# Patient Record
Sex: MALE | Race: WHITE | NOT HISPANIC OR LATINO | URBAN - METROPOLITAN AREA
[De-identification: names, ages, dates, MRNs, and addresses within clinical notes are randomized per-mention and may not be internally consistent; named-entity substitution may affect disease eponyms.]

---

## 2017-05-15 ENCOUNTER — OUTPATIENT (OUTPATIENT)
Dept: OUTPATIENT SERVICES | Facility: HOSPITAL | Age: 47
LOS: 1 days | Discharge: HOME | End: 2017-05-15

## 2017-06-28 DIAGNOSIS — R91.8 OTHER NONSPECIFIC ABNORMAL FINDING OF LUNG FIELD: ICD-10-CM

## 2017-09-04 ENCOUNTER — TRANSCRIPTION ENCOUNTER (OUTPATIENT)
Age: 47
End: 2017-09-04

## 2018-05-20 ENCOUNTER — TRANSCRIPTION ENCOUNTER (OUTPATIENT)
Age: 48
End: 2018-05-20

## 2018-05-22 ENCOUNTER — OUTPATIENT (OUTPATIENT)
Dept: OUTPATIENT SERVICES | Facility: HOSPITAL | Age: 48
LOS: 1 days | Discharge: HOME | End: 2018-05-22

## 2018-05-22 DIAGNOSIS — R05 COUGH: ICD-10-CM

## 2018-06-19 ENCOUNTER — OUTPATIENT (OUTPATIENT)
Dept: OUTPATIENT SERVICES | Facility: HOSPITAL | Age: 48
LOS: 1 days | Discharge: HOME | End: 2018-06-19

## 2018-06-19 DIAGNOSIS — R91.8 OTHER NONSPECIFIC ABNORMAL FINDING OF LUNG FIELD: ICD-10-CM

## 2020-01-24 ENCOUNTER — TRANSCRIPTION ENCOUNTER (OUTPATIENT)
Age: 50
End: 2020-01-24

## 2020-03-14 ENCOUNTER — OUTPATIENT (OUTPATIENT)
Dept: OUTPATIENT SERVICES | Facility: HOSPITAL | Age: 50
LOS: 1 days | Discharge: HOME | End: 2020-03-14
Payer: COMMERCIAL

## 2020-03-14 DIAGNOSIS — R91.8 OTHER NONSPECIFIC ABNORMAL FINDING OF LUNG FIELD: ICD-10-CM

## 2020-03-14 PROCEDURE — 71250 CT THORAX DX C-: CPT | Mod: 26

## 2020-03-25 PROBLEM — Z00.00 ENCOUNTER FOR PREVENTIVE HEALTH EXAMINATION: Status: ACTIVE | Noted: 2020-03-25

## 2020-05-05 ENCOUNTER — APPOINTMENT (OUTPATIENT)
Dept: GASTROENTEROLOGY | Facility: CLINIC | Age: 50
End: 2020-05-05
Payer: COMMERCIAL

## 2020-05-05 DIAGNOSIS — Z78.9 OTHER SPECIFIED HEALTH STATUS: ICD-10-CM

## 2020-05-05 DIAGNOSIS — Z87.09 PERSONAL HISTORY OF OTHER DISEASES OF THE RESPIRATORY SYSTEM: ICD-10-CM

## 2020-05-05 DIAGNOSIS — Z80.8 FAMILY HISTORY OF MALIGNANT NEOPLASM OF OTHER ORGANS OR SYSTEMS: ICD-10-CM

## 2020-05-05 DIAGNOSIS — Z12.11 ENCOUNTER FOR SCREENING FOR MALIGNANT NEOPLASM OF COLON: ICD-10-CM

## 2020-05-05 DIAGNOSIS — Z87.898 PERSONAL HISTORY OF OTHER SPECIFIED CONDITIONS: ICD-10-CM

## 2020-05-05 PROCEDURE — 99204 OFFICE O/P NEW MOD 45 MIN: CPT | Mod: 95

## 2020-05-05 NOTE — HISTORY OF PRESENT ILLNESS
[Home] : at home, [unfilled] , at the time of the visit. [Medical Office: ([facility])___] : at the medical office located in  [Patient] : the patient [Self] : self [FreeTextEntry2] : Joe Hobson [FreeTextEntry4] : Raven Cole [Nausea] : denies nausea [Vomiting] : denies vomiting [Diarrhea] : denies diarrhea [Constipation] : denies constipation [Yellow Skin Or Eyes (Jaundice)] : denies jaundice [Abdominal Pain] : denies abdominal pain [Abdominal Swelling] : denies abdominal swelling [Rectal Pain] : denies rectal pain [de-identified] : This is a 49 year old male who presents for evaluation of dysphagia.  The patient has a history of delayed swallowing for many years.  He has been told that he has achalasia.  He has never had any work-up for achalasia.  The patient is able to eat when he eats slowly, but has reflux, and cannot eat after 5 PM.  His symptoms are controlled on omeprazole 20 mg p.o. daily.  He has never had to go to the emergency room for dysphagia.  He denies weight loss or black or bloody stools.  He denies nausea or vomiting if he eats slowly.  Patient has never had a colonoscopy.  The patient had a CAT scan for follow-up of pulmonary nodules and a dilated fluid-filled esophagus was found in March 2020 it had also been found on a previous CAT scan of the chest

## 2020-07-07 ENCOUNTER — OUTPATIENT (OUTPATIENT)
Dept: OUTPATIENT SERVICES | Facility: HOSPITAL | Age: 50
LOS: 1 days | Discharge: HOME | End: 2020-07-07

## 2020-07-07 ENCOUNTER — LABORATORY RESULT (OUTPATIENT)
Age: 50
End: 2020-07-07

## 2020-07-07 DIAGNOSIS — Z11.59 ENCOUNTER FOR SCREENING FOR OTHER VIRAL DISEASES: ICD-10-CM

## 2020-07-27 ENCOUNTER — OUTPATIENT (OUTPATIENT)
Dept: OUTPATIENT SERVICES | Facility: HOSPITAL | Age: 50
LOS: 1 days | Discharge: HOME | End: 2020-07-27

## 2020-07-27 ENCOUNTER — LABORATORY RESULT (OUTPATIENT)
Age: 50
End: 2020-07-27

## 2020-07-27 DIAGNOSIS — Z11.59 ENCOUNTER FOR SCREENING FOR OTHER VIRAL DISEASES: ICD-10-CM

## 2020-07-30 ENCOUNTER — RESULT REVIEW (OUTPATIENT)
Age: 50
End: 2020-07-30

## 2020-07-30 ENCOUNTER — OUTPATIENT (OUTPATIENT)
Dept: OUTPATIENT SERVICES | Facility: HOSPITAL | Age: 50
LOS: 1 days | Discharge: HOME | End: 2020-07-30
Payer: COMMERCIAL

## 2020-07-30 ENCOUNTER — TRANSCRIPTION ENCOUNTER (OUTPATIENT)
Age: 50
End: 2020-07-30

## 2020-07-30 VITALS — HEART RATE: 67 BPM | SYSTOLIC BLOOD PRESSURE: 111 MMHG | OXYGEN SATURATION: 95 % | RESPIRATION RATE: 15 BRPM

## 2020-07-30 VITALS
DIASTOLIC BLOOD PRESSURE: 78 MMHG | HEART RATE: 95 BPM | RESPIRATION RATE: 18 BRPM | HEIGHT: 66 IN | WEIGHT: 149.91 LBS | SYSTOLIC BLOOD PRESSURE: 127 MMHG | TEMPERATURE: 98 F

## 2020-07-30 DIAGNOSIS — Z12.11 ENCOUNTER FOR SCREENING FOR MALIGNANT NEOPLASM OF COLON: ICD-10-CM

## 2020-07-30 PROCEDURE — 88305 TISSUE EXAM BY PATHOLOGIST: CPT | Mod: 26

## 2020-07-30 PROCEDURE — 45380 COLONOSCOPY AND BIOPSY: CPT

## 2020-07-30 PROCEDURE — 88312 SPECIAL STAINS GROUP 1: CPT | Mod: 26

## 2020-07-30 PROCEDURE — 43239 EGD BIOPSY SINGLE/MULTIPLE: CPT | Mod: XS

## 2020-07-30 RX ORDER — ONDANSETRON 8 MG/1
4 TABLET, FILM COATED ORAL ONCE
Refills: 0 | Status: DISCONTINUED | OUTPATIENT
Start: 2020-07-30 | End: 2020-08-14

## 2020-07-30 RX ORDER — SODIUM CHLORIDE 9 MG/ML
1000 INJECTION, SOLUTION INTRAVENOUS
Refills: 0 | Status: DISCONTINUED | OUTPATIENT
Start: 2020-07-30 | End: 2020-08-14

## 2020-07-30 NOTE — ASU PATIENT PROFILE, ADULT - NS PRO PASSIVE SMOKE EXP
[NL] : warm [Clear Effusion] : clear effusion [Erythema] : erythema [Purulent Effusion] : purulent effusion [Clear Rhinorrhea] : clear rhinorrhea No

## 2020-07-30 NOTE — ASU DISCHARGE PLAN (ADULT/PEDIATRIC) - ACCOMPANIED BY
Spouse Non-Graft Cartilage Fenestration Text: The cartilage was fenestrated with a 2mm punch biopsy to help facilitate healing.

## 2020-07-30 NOTE — ASU DISCHARGE PLAN (ADULT/PEDIATRIC) - ASU DC SPECIAL INSTRUCTIONSFT
Follow up with Dr Falk for complete workup for dysphagia Follow up with Dr Falk for complete workup for dysphagia  Pt scheduled for motility study on 8/6/2020 at 940 am , will need covid testing on 8/3/2020

## 2020-07-30 NOTE — H&P PST ADULT - HISTORY OF PRESENT ILLNESS
This is a 49 year old male who presents for evaluation of dysphagia. The patient has a history of delayed swallowing for many years. He has been told that he has achalasia. He has never had any work-up for achalasia. The patient is able to eat when he eats slowly, but has reflux, and cannot eat after 5 PM.

## 2020-07-31 LAB
SURGICAL PATHOLOGY STUDY: SIGNIFICANT CHANGE UP
SURGICAL PATHOLOGY STUDY: SIGNIFICANT CHANGE UP

## 2020-08-03 DIAGNOSIS — K64.8 OTHER HEMORRHOIDS: ICD-10-CM

## 2020-08-03 DIAGNOSIS — K62.89 OTHER SPECIFIED DISEASES OF ANUS AND RECTUM: ICD-10-CM

## 2020-08-03 DIAGNOSIS — K21.0 GASTRO-ESOPHAGEAL REFLUX DISEASE WITH ESOPHAGITIS: ICD-10-CM

## 2020-08-03 DIAGNOSIS — K29.50 UNSPECIFIED CHRONIC GASTRITIS WITHOUT BLEEDING: ICD-10-CM

## 2020-08-04 ENCOUNTER — OUTPATIENT (OUTPATIENT)
Dept: OUTPATIENT SERVICES | Facility: HOSPITAL | Age: 50
LOS: 1 days | Discharge: HOME | End: 2020-08-04

## 2020-08-04 ENCOUNTER — LABORATORY RESULT (OUTPATIENT)
Age: 50
End: 2020-08-04

## 2020-08-04 DIAGNOSIS — Z11.59 ENCOUNTER FOR SCREENING FOR OTHER VIRAL DISEASES: ICD-10-CM

## 2020-08-04 PROBLEM — J84.9 INTERSTITIAL PULMONARY DISEASE, UNSPECIFIED: Chronic | Status: ACTIVE | Noted: 2020-07-30

## 2020-08-06 ENCOUNTER — RESULT REVIEW (OUTPATIENT)
Age: 50
End: 2020-08-06

## 2020-08-06 ENCOUNTER — OUTPATIENT (OUTPATIENT)
Dept: OUTPATIENT SERVICES | Facility: HOSPITAL | Age: 50
LOS: 1 days | Discharge: HOME | End: 2020-08-06
Payer: COMMERCIAL

## 2020-08-06 VITALS
HEART RATE: 71 BPM | RESPIRATION RATE: 18 BRPM | SYSTOLIC BLOOD PRESSURE: 124 MMHG | HEIGHT: 66 IN | OXYGEN SATURATION: 100 % | DIASTOLIC BLOOD PRESSURE: 71 MMHG | WEIGHT: 154.98 LBS | TEMPERATURE: 99 F

## 2020-08-06 VITALS
DIASTOLIC BLOOD PRESSURE: 77 MMHG | HEART RATE: 71 BPM | OXYGEN SATURATION: 98 % | SYSTOLIC BLOOD PRESSURE: 143 MMHG | RESPIRATION RATE: 18 BRPM

## 2020-08-06 PROCEDURE — 91037 ESOPH IMPED FUNCTION TEST: CPT | Mod: 26

## 2020-08-06 PROCEDURE — 91010 ESOPHAGUS MOTILITY STUDY: CPT | Mod: 26

## 2020-08-06 PROCEDURE — 71045 X-RAY EXAM CHEST 1 VIEW: CPT | Mod: 26

## 2020-08-11 DIAGNOSIS — R13.10 DYSPHAGIA, UNSPECIFIED: ICD-10-CM

## 2020-08-11 DIAGNOSIS — J84.9 INTERSTITIAL PULMONARY DISEASE, UNSPECIFIED: ICD-10-CM

## 2020-10-26 ENCOUNTER — TRANSCRIPTION ENCOUNTER (OUTPATIENT)
Age: 50
End: 2020-10-26

## 2020-11-28 ENCOUNTER — TRANSCRIPTION ENCOUNTER (OUTPATIENT)
Age: 50
End: 2020-11-28

## 2020-11-29 ENCOUNTER — TRANSCRIPTION ENCOUNTER (OUTPATIENT)
Age: 50
End: 2020-11-29

## 2020-12-23 PROBLEM — Z12.11 ENCOUNTER FOR SCREENING COLONOSCOPY: Status: RESOLVED | Noted: 2020-05-05 | Resolved: 2020-12-23

## 2021-02-19 ENCOUNTER — TRANSCRIPTION ENCOUNTER (OUTPATIENT)
Age: 51
End: 2021-02-19

## 2021-02-28 ENCOUNTER — TRANSCRIPTION ENCOUNTER (OUTPATIENT)
Age: 51
End: 2021-02-28

## 2021-03-05 ENCOUNTER — TRANSCRIPTION ENCOUNTER (OUTPATIENT)
Age: 51
End: 2021-03-05

## 2021-03-10 ENCOUNTER — NON-APPOINTMENT (OUTPATIENT)
Age: 51
End: 2021-03-10

## 2021-03-10 DIAGNOSIS — R06.02 SHORTNESS OF BREATH: ICD-10-CM

## 2021-03-10 DIAGNOSIS — J45.909 UNSPECIFIED ASTHMA, UNCOMPLICATED: ICD-10-CM

## 2021-03-10 DIAGNOSIS — R93.89 ABNORMAL FINDINGS ON DIAGNOSTIC IMAGING OF OTHER SPECIFIED BODY STRUCTURES: ICD-10-CM

## 2021-03-16 ENCOUNTER — APPOINTMENT (OUTPATIENT)
Dept: PULMONOLOGY | Facility: CLINIC | Age: 51
End: 2021-03-16
Payer: COMMERCIAL

## 2021-03-16 VITALS
OXYGEN SATURATION: 98 % | BODY MASS INDEX: 26.52 KG/M2 | RESPIRATION RATE: 14 BRPM | DIASTOLIC BLOOD PRESSURE: 78 MMHG | WEIGHT: 165 LBS | HEIGHT: 66 IN | SYSTOLIC BLOOD PRESSURE: 120 MMHG | HEART RATE: 86 BPM

## 2021-03-16 PROCEDURE — 99213 OFFICE O/P EST LOW 20 MIN: CPT

## 2021-03-16 PROCEDURE — 99072 ADDL SUPL MATRL&STAF TM PHE: CPT

## 2021-03-16 NOTE — PHYSICAL EXAM
[No Acute Distress] : no acute distress [Normal Oropharynx] : normal oropharynx [Normal Appearance] : normal appearance [No Neck Mass] : no neck mass [Normal Rate/Rhythm] : normal rate/rhythm [Normal S1, S2] : normal s1, s2 [No Murmurs] : no murmurs [No Resp Distress] : no resp distress [Clear to Auscultation Bilaterally] : clear to auscultation bilaterally [Normal Gait] : normal gait [No Clubbing] : no clubbing [No Cyanosis] : no cyanosis [No Edema] : no edema [FROM] : FROM [No Focal Deficits] : no focal deficits [Oriented x3] : oriented x3 [Normal Affect] : normal affect

## 2021-03-16 NOTE — HISTORY OF PRESENT ILLNESS
[Doing Well] : doing well [Wheezing] : denies wheezing [Cough] : denies coughing [Shortness Of Breath] : denies shortness of breath [Chest Tightness Or Heavy Pressure] : denies chest tightness [Feelings Of Weakness On Exertion] : denies reduced exercise tolerance [Cough at Night] : not awakened at night with cough [Wheezing at Night] : not awakened at night because of wheezing or trouble breathing [Goals--Doing Well] : the patient is doing well with ~his/her~ asthma goals

## 2021-07-12 ENCOUNTER — APPOINTMENT (OUTPATIENT)
Age: 51
End: 2021-07-12

## 2021-07-12 ENCOUNTER — APPOINTMENT (OUTPATIENT)
Dept: PULMONOLOGY | Facility: CLINIC | Age: 51
End: 2021-07-12

## 2021-07-13 NOTE — ASU PREOP CHECKLIST - HEIGHT IN INCHES
Klaudia Melendez presents today for   Chief Complaint   Patient presents with    Follow-up    Back Pain       Is someone accompanying this pt? no    Is the patient using any DME equipment during OV? no    Depression Screening:  3 most recent PHQ Screens 7/13/2021   Little interest or pleasure in doing things Not at all   Feeling down, depressed, irritable, or hopeless Not at all   Total Score PHQ 2 0       Learning Assessment:  Learning Assessment 7/6/2021   PRIMARY LEARNER Patient   HIGHEST LEVEL OF EDUCATION - PRIMARY LEARNER  GRADUATED HIGH SCHOOL OR GED   BARRIERS PRIMARY LEARNER NONE   CO-LEARNER CAREGIVER No   PRIMARY LANGUAGE ENGLISH   LEARNER PREFERENCE PRIMARY READING   ANSWERED BY patient    RELATIONSHIP SELF       Health Maintenance reviewed and discussed and ordered per Provider. Health Maintenance Due   Topic Date Due    Pneumococcal 0-64 years (1 of 2 - PPSV23) Never done    DTaP/Tdap/Td series (1 - Tdap) Never done    Colorectal Cancer Screening Combo  Never done    Breast Cancer Screen Mammogram  07/13/2016   . Coordination of Care:  1. Have you been to the ER, urgent care clinic since your last visit? Hospitalized since your last visit? no    2. Have you seen or consulted any other health care providers outside of the [address] [place] [address] since your last visit? Include any pap smears or colon screening.  no      Health Maintenance Due   Topic Date Due    Pneumococcal 0-64 years (1 of 2 - PPSV23) Never done    DTaP/Tdap/Td series (1 - Tdap) Never done    Colorectal Cancer Screening Combo  Never done    Breast Cancer Screen Mammogram  07/13/2016 6

## 2021-12-04 ENCOUNTER — TRANSCRIPTION ENCOUNTER (OUTPATIENT)
Age: 51
End: 2021-12-04

## 2021-12-08 ENCOUNTER — APPOINTMENT (OUTPATIENT)
Age: 51
End: 2021-12-08
Payer: COMMERCIAL

## 2021-12-08 VITALS
BODY MASS INDEX: 26.2 KG/M2 | HEART RATE: 78 BPM | HEIGHT: 66 IN | DIASTOLIC BLOOD PRESSURE: 70 MMHG | RESPIRATION RATE: 14 BRPM | SYSTOLIC BLOOD PRESSURE: 120 MMHG | WEIGHT: 163 LBS | OXYGEN SATURATION: 98 %

## 2021-12-08 PROCEDURE — 99214 OFFICE O/P EST MOD 30 MIN: CPT

## 2022-04-29 RX ORDER — BUDESONIDE AND FORMOTEROL FUMARATE DIHYDRATE 80; 4.5 UG/1; UG/1
80-4.5 AEROSOL RESPIRATORY (INHALATION) TWICE DAILY
Refills: 0 | Status: DISCONTINUED | COMMUNITY
End: 2022-04-29

## 2022-04-29 RX ORDER — OMEPRAZOLE 20 MG/1
20 TABLET, DELAYED RELEASE ORAL
Refills: 0 | Status: DISCONTINUED | COMMUNITY
End: 2022-04-29

## 2022-04-29 RX ORDER — ALBUTEROL SULFATE 90 UG/1
108 (90 BASE) INHALANT RESPIRATORY (INHALATION)
Qty: 128 | Refills: 2 | Status: DISCONTINUED | COMMUNITY
Start: 2021-03-16 | End: 2022-04-29

## 2022-04-29 RX ORDER — SODIUM SULFATE, POTASSIUM SULFATE, MAGNESIUM SULFATE 17.5; 3.13; 1.6 G/ML; G/ML; G/ML
17.5-3.13-1.6 SOLUTION, CONCENTRATE ORAL
Qty: 2 | Refills: 0 | Status: DISCONTINUED | COMMUNITY
Start: 2020-05-05 | End: 2022-04-29

## 2022-05-04 NOTE — HISTORY OF PRESENT ILLNESS
[Home] : at home, [unfilled] , at the time of the visit. [Verbal consent obtained from patient] : the patient, [unfilled] [Other Location: e.g. Home (Enter Location, City,State)___] : at [unfilled] [FreeTextEntry4] : Raven Cole

## 2022-05-09 ENCOUNTER — APPOINTMENT (OUTPATIENT)
Dept: GASTROENTEROLOGY | Facility: CLINIC | Age: 52
End: 2022-05-09

## 2022-05-23 ENCOUNTER — APPOINTMENT (OUTPATIENT)
Age: 52
End: 2022-05-23
Payer: COMMERCIAL

## 2022-05-23 VITALS
SYSTOLIC BLOOD PRESSURE: 130 MMHG | DIASTOLIC BLOOD PRESSURE: 70 MMHG | WEIGHT: 165 LBS | HEIGHT: 66 IN | BODY MASS INDEX: 26.52 KG/M2 | RESPIRATION RATE: 14 BRPM | HEART RATE: 102 BPM | OXYGEN SATURATION: 97 %

## 2022-05-23 PROCEDURE — 94727 GAS DIL/WSHOT DETER LNG VOL: CPT

## 2022-05-23 PROCEDURE — 94729 DIFFUSING CAPACITY: CPT

## 2022-05-23 PROCEDURE — 94010 BREATHING CAPACITY TEST: CPT

## 2022-05-23 PROCEDURE — 99213 OFFICE O/P EST LOW 20 MIN: CPT | Mod: 25

## 2022-05-23 RX ORDER — ALBUTEROL SULFATE 90 UG/1
108 (90 BASE) INHALANT RESPIRATORY (INHALATION)
Qty: 1 | Refills: 5 | Status: ACTIVE | COMMUNITY
Start: 2022-05-23

## 2022-05-29 ENCOUNTER — NON-APPOINTMENT (OUTPATIENT)
Age: 52
End: 2022-05-29

## 2022-10-25 ENCOUNTER — APPOINTMENT (OUTPATIENT)
Dept: ORTHOPEDIC SURGERY | Facility: CLINIC | Age: 52
End: 2022-10-25

## 2022-10-25 DIAGNOSIS — M16.11 UNILATERAL PRIMARY OSTEOARTHRITIS, RIGHT HIP: ICD-10-CM

## 2022-10-25 PROCEDURE — 99203 OFFICE O/P NEW LOW 30 MIN: CPT

## 2022-10-25 PROCEDURE — 73502 X-RAY EXAM HIP UNI 2-3 VIEWS: CPT

## 2022-10-25 NOTE — IMAGING
[de-identified] :  pleasant easy to examine in no distress\par \par Lumbar exam some spasm tightness negative straight leg bilaterally reflexes brisk and symmetric\par \par Hips good motion mild groin pain on the right good strength normal gait\par \par Right hip x-ray unremarkable no arthritis no AVN

## 2022-10-25 NOTE — ASSESSMENT
[FreeTextEntry1] :  recommended conservative treatment stretching heat some Tylenol indicated that his symptoms from the hip or mild there was no reason to get an MRI of the hip back.  Return p.r.n.

## 2022-10-25 NOTE — HISTORY OF PRESENT ILLNESS
[de-identified] : 52-year-old gentleman works in a [employer] this difficulty with pain in the right hip when driving getting up out of a seated position no trauma it has been a few months denies any back pain some lung disease also has achalasia pain is 1 at rest 3 with activity does not smoke no drug allergies no treatment in the past no problem with the left hip

## 2022-11-07 ENCOUNTER — APPOINTMENT (OUTPATIENT)
Age: 52
End: 2022-11-07

## 2022-11-07 VITALS
DIASTOLIC BLOOD PRESSURE: 78 MMHG | WEIGHT: 162 LBS | SYSTOLIC BLOOD PRESSURE: 110 MMHG | OXYGEN SATURATION: 98 % | BODY MASS INDEX: 26.03 KG/M2 | HEIGHT: 66 IN | HEART RATE: 88 BPM

## 2022-11-07 PROCEDURE — 99213 OFFICE O/P EST LOW 20 MIN: CPT

## 2022-12-27 ENCOUNTER — RESULT REVIEW (OUTPATIENT)
Age: 52
End: 2022-12-27

## 2022-12-27 ENCOUNTER — OUTPATIENT (OUTPATIENT)
Dept: OUTPATIENT SERVICES | Facility: HOSPITAL | Age: 52
LOS: 1 days | Discharge: HOME | End: 2022-12-27

## 2022-12-27 DIAGNOSIS — R91.8 OTHER NONSPECIFIC ABNORMAL FINDING OF LUNG FIELD: ICD-10-CM

## 2022-12-27 PROCEDURE — 71250 CT THORAX DX C-: CPT | Mod: 26

## 2023-03-16 ENCOUNTER — NON-APPOINTMENT (OUTPATIENT)
Age: 53
End: 2023-03-16

## 2023-03-20 ENCOUNTER — NON-APPOINTMENT (OUTPATIENT)
Age: 53
End: 2023-03-20

## 2023-04-06 ENCOUNTER — APPOINTMENT (OUTPATIENT)
Dept: OTOLARYNGOLOGY | Facility: CLINIC | Age: 53
End: 2023-04-06
Payer: COMMERCIAL

## 2023-04-06 ENCOUNTER — NON-APPOINTMENT (OUTPATIENT)
Age: 53
End: 2023-04-06

## 2023-04-06 VITALS
SYSTOLIC BLOOD PRESSURE: 139 MMHG | WEIGHT: 162 LBS | OXYGEN SATURATION: 96 % | TEMPERATURE: 98 F | HEIGHT: 66 IN | DIASTOLIC BLOOD PRESSURE: 79 MMHG | HEART RATE: 82 BPM | BODY MASS INDEX: 26.03 KG/M2

## 2023-04-06 PROCEDURE — 99204 OFFICE O/P NEW MOD 45 MIN: CPT | Mod: 25

## 2023-04-06 PROCEDURE — 92550 TYMPANOMETRY & REFLEX THRESH: CPT | Mod: 52

## 2023-04-06 PROCEDURE — 92557 COMPREHENSIVE HEARING TEST: CPT

## 2023-04-06 PROCEDURE — 31575 DIAGNOSTIC LARYNGOSCOPY: CPT

## 2023-04-06 NOTE — ASSESSMENT
[FreeTextEntry1] : Reviewed reflux precautions and provided the patient with the corresponding educational handout. Daily famotidine & RTC 6 wks\par \par I asked him to see Dr. Bright regarding his achalasia\par \par CT sinus & RTC\par \par RTC annually for audios, sooner as needed for worsening hearing or tinnitus. Discussed the importance not putting qtips or other foreign objects in the ears.\par

## 2023-04-06 NOTE — PHYSICAL EXAM
[Nasal Endoscopy Performed] : nasal endoscopy was performed, see procedure section for findings [] : septum deviated to the left [Removed] : palatine tonsils previously removed [Laryngoscopy Performed] : laryngoscopy was performed, see procedure section for findings [Normal] : no masses and lesions seen, face is symmetric

## 2023-04-06 NOTE — PROCEDURE
[FreeTextEntry6] : Indication: requirement for exam not possible via anterior rhinoscopy; chronic sinusitis\par After verbal consent and the administration of an aerosolized phenylephrine/lidocaine mix, examination was performed with a flexible endoscope attached to a video monitoring system. Findings:\par Septum: widely to the L\par Mucosa: normal\par Polyposis: not present\par Inferior turbinates: unremarkable\par Middle and superior turbinates: normal\par Inferior meatus: unremarkable\par Middle meatus: unremarkable\par Superior meatus: unremarkable\par Speno-ethmoidal recess: unremarkable\par Nasopharynx: unremarkable\par Secretions: unremarkable\par Other findings: none [de-identified] : Indication: requirement for exam not possible via anterior examination; hoarseness\par After verbal consent and the administration of an aerosolized phenylephrine/lidocaine mix, examination was performed with a flexible endoscope placed through the nose which was attached to a video monitoring system. Findings:\par Nasopharynx: unremarkable\par Soft palate, lateral and posterior pharyngeal walls: unremarkable\par Base of tongue & lingual tonsil: moderate retrodisplacement\par Vallecula: unremarkable\par Epiglottis: unremarkable\par Piriform sinuses and pharyngoesophageal junction: unremarkable\par Arytenoids and AE folds: mild-mod interarytenoid edema\par Ventricle and false vocal folds: unremarkable\par True vocal folds: early anterior third nodules; normal movement bilaterally with good apposition in phonation\par Visible subglottis: unremarkable\par Narrow-band imaging: not used\par Other findings: ELM

## 2023-04-06 NOTE — HISTORY OF PRESENT ILLNESS
[de-identified] : Chronic throat trouble/sore throat/sinus infections diagnosed as strep throat & repeatedly treated with antibiotics until ~2015 when he was dx w/ achalasia. He takes omeprazole as needed for hoarseness & throat irritation but not riccardo heartburn. \par Hx of a lot of sinus infections which can last for months; these consist of "fluid running down the throat" and include midfacial pressure; however, no discolored rhinorrhea. He thinks these occur after nocturnal episodes of reflux. \par At times he also has ear clogging if he eats late d/t reflux. No baseline HL but has bilat nonpulsatile tinnitus. Lots of HL in the family; uses qtips. \par No obvious allergy sxs.

## 2023-04-06 NOTE — DATA REVIEWED
[de-identified] : today: nl to severe SNHL AU;  AU\par - Immitance testing w/ type A AU\par  [de-identified] : EGD 7/20: tortuous/diffuse dilation of the esophageal lumen\par HRM 8/20: absent contractility

## 2023-04-17 ENCOUNTER — APPOINTMENT (OUTPATIENT)
Dept: NEUROSURGERY | Facility: CLINIC | Age: 53
End: 2023-04-17

## 2023-05-15 ENCOUNTER — APPOINTMENT (OUTPATIENT)
Dept: OTOLARYNGOLOGY | Facility: CLINIC | Age: 53
End: 2023-05-15
Payer: COMMERCIAL

## 2023-05-15 VITALS
HEIGHT: 66 IN | OXYGEN SATURATION: 97 % | SYSTOLIC BLOOD PRESSURE: 121 MMHG | TEMPERATURE: 97.7 F | HEART RATE: 67 BPM | DIASTOLIC BLOOD PRESSURE: 82 MMHG | WEIGHT: 151 LBS | BODY MASS INDEX: 24.27 KG/M2

## 2023-05-15 PROCEDURE — 99213 OFFICE O/P EST LOW 20 MIN: CPT

## 2023-05-15 NOTE — HISTORY OF PRESENT ILLNESS
[de-identified] : Chronic throat trouble/sore throat/sinus infections diagnosed as strep throat & repeatedly treated with antibiotics until ~2015 when he was dx w/ achalasia- hasn't seen Dr. Bright yet. Now on famotidine with controlled riccardo heartburn. \par Hx of a lot of sinus infections which can last for months; these consist of "fluid running down the throat" and include midfacial pressure; however, no discolored rhinorrhea. He thinks these occur after nocturnal episodes of reflux. No obvious allergy sxs. CT pending. \par At times he also has ear clogging if he eats late d/t reflux. No baseline HL but has bilat nonpulsatile tinnitus. Lots of HL in the family; uses qtips.

## 2023-05-15 NOTE — DATA REVIEWED
[de-identified] : 4/23: nl to severe SNHL AU;  AU\par - Immitance testing w/ type A AU\par  [de-identified] : EGD 7/20: tortuous/diffuse dilation of the esophageal lumen\par HRM 8/20: absent contractility

## 2023-05-15 NOTE — PHYSICAL EXAM
[] : septum deviated to the left [Removed] : palatine tonsils previously removed [Normal] : the left middle ear was normal

## 2023-05-15 NOTE — ASSESSMENT
[FreeTextEntry1] : Will call w/ CT results & next steps\par \par Reinforced behavioral modification as previously discussed.\par \par RTC 3 months sooner prn

## 2023-05-16 ENCOUNTER — APPOINTMENT (OUTPATIENT)
Dept: GASTROENTEROLOGY | Facility: CLINIC | Age: 53
End: 2023-05-16
Payer: COMMERCIAL

## 2023-05-16 VITALS
BODY MASS INDEX: 25.39 KG/M2 | TEMPERATURE: 98.4 F | HEART RATE: 72 BPM | HEIGHT: 66 IN | OXYGEN SATURATION: 98 % | SYSTOLIC BLOOD PRESSURE: 120 MMHG | RESPIRATION RATE: 15 BRPM | DIASTOLIC BLOOD PRESSURE: 79 MMHG | WEIGHT: 158 LBS

## 2023-05-16 DIAGNOSIS — K22.0 ACHALASIA OF CARDIA: ICD-10-CM

## 2023-05-16 PROCEDURE — 99203 OFFICE O/P NEW LOW 30 MIN: CPT

## 2023-05-16 RX ORDER — IPRATROPIUM BROMIDE 21 UG/1
0.03 SPRAY NASAL
Qty: 30 | Refills: 0 | Status: ACTIVE | COMMUNITY
Start: 2023-03-17

## 2023-05-16 NOTE — PHYSICAL EXAM
[Alert] : alert [Heart Rate And Rhythm] : heart rate was normal and rhythm regular [Abdomen Tenderness] : non-tender [Abdomen Soft] : soft [Oriented To Time, Place, And Person] : oriented to person, place, and time [Normal Mood] : the mood was normal

## 2023-05-30 ENCOUNTER — APPOINTMENT (OUTPATIENT)
Dept: PULMONOLOGY | Facility: CLINIC | Age: 53
End: 2023-05-30
Payer: COMMERCIAL

## 2023-05-30 ENCOUNTER — APPOINTMENT (OUTPATIENT)
Dept: CT IMAGING | Facility: HOSPITAL | Age: 53
End: 2023-05-30

## 2023-05-30 ENCOUNTER — APPOINTMENT (OUTPATIENT)
Dept: PULMONOLOGY | Facility: CLINIC | Age: 53
End: 2023-05-30

## 2023-05-30 PROCEDURE — 94729 DIFFUSING CAPACITY: CPT

## 2023-05-30 PROCEDURE — 94727 GAS DIL/WSHOT DETER LNG VOL: CPT

## 2023-05-30 PROCEDURE — 94010 BREATHING CAPACITY TEST: CPT

## 2023-05-30 NOTE — ASSESSMENT
[FreeTextEntry1] : Patient is a 52 year old male with a history of Asthma and Achalasia who presents due to referral from Dr. Jesús Patino for evaluation.\par \par Patient advised to restart omeprazole 40 mg daily.  Prescription sent to the pharmacy.  \par \par Dr. Bright discussed the options of Heller Myotomy and POEM (per-oral endoscopic myotomy) for the treatment of Achalasia.  Patient was in agreement to proceed with the POEM.  R/B/C and prep (pre and post liquid diet) reviewed with patient.  Patient to obtain Pulmonary clearance from Dr. Mikey Daigle for the procedure.\par \par \par I, Diane Perez; PA, am scribing for and in the presence of Dr. Homar Bright the following sections: history of present illness, past medical/family/social history; review of systems; vital signs; physical exam; disposition.\par

## 2023-05-30 NOTE — HISTORY OF PRESENT ILLNESS
[FreeTextEntry1] : 07/30/2020: normal colon, internal hemorrhoids; repeat in 10 years [de-identified] : CT chest without contrast: 12/27/2022 showed stable distended dilated esophagus suggesting achalasia and stable 5 mm right upper lobe nodule.

## 2023-06-13 ENCOUNTER — APPOINTMENT (OUTPATIENT)
Dept: GASTROENTEROLOGY | Facility: CLINIC | Age: 53
End: 2023-06-13

## 2023-08-30 ENCOUNTER — NON-APPOINTMENT (OUTPATIENT)
Age: 53
End: 2023-08-30

## 2023-09-26 ENCOUNTER — APPOINTMENT (OUTPATIENT)
Dept: OTOLARYNGOLOGY | Facility: CLINIC | Age: 53
End: 2023-09-26
Payer: COMMERCIAL

## 2023-09-26 VITALS
SYSTOLIC BLOOD PRESSURE: 114 MMHG | HEIGHT: 66 IN | DIASTOLIC BLOOD PRESSURE: 75 MMHG | HEART RATE: 69 BPM | TEMPERATURE: 97.7 F | BODY MASS INDEX: 25.71 KG/M2 | WEIGHT: 160 LBS | OXYGEN SATURATION: 96 %

## 2023-09-26 PROCEDURE — 99213 OFFICE O/P EST LOW 20 MIN: CPT | Mod: 25

## 2023-09-26 PROCEDURE — 69210 REMOVE IMPACTED EAR WAX UNI: CPT

## 2023-09-26 PROCEDURE — 99214 OFFICE O/P EST MOD 30 MIN: CPT | Mod: 25

## 2023-10-05 ENCOUNTER — RESULT REVIEW (OUTPATIENT)
Age: 53
End: 2023-10-05

## 2023-10-05 ENCOUNTER — OUTPATIENT (OUTPATIENT)
Dept: OUTPATIENT SERVICES | Facility: HOSPITAL | Age: 53
LOS: 1 days | End: 2023-10-05

## 2023-10-05 ENCOUNTER — APPOINTMENT (OUTPATIENT)
Dept: CT IMAGING | Facility: CLINIC | Age: 53
End: 2023-10-05
Payer: COMMERCIAL

## 2023-10-05 PROCEDURE — 71250 CT THORAX DX C-: CPT | Mod: 26

## 2023-10-05 PROCEDURE — 70486 CT MAXILLOFACIAL W/O DYE: CPT | Mod: 26

## 2023-10-12 ENCOUNTER — APPOINTMENT (OUTPATIENT)
Dept: OTOLARYNGOLOGY | Facility: CLINIC | Age: 53
End: 2023-10-12
Payer: COMMERCIAL

## 2023-10-12 VITALS
DIASTOLIC BLOOD PRESSURE: 75 MMHG | SYSTOLIC BLOOD PRESSURE: 145 MMHG | TEMPERATURE: 97.7 F | WEIGHT: 160 LBS | OXYGEN SATURATION: 98 % | BODY MASS INDEX: 25.71 KG/M2 | HEART RATE: 89 BPM | HEIGHT: 66 IN

## 2023-10-12 PROCEDURE — 99214 OFFICE O/P EST MOD 30 MIN: CPT

## 2023-10-31 ENCOUNTER — NON-APPOINTMENT (OUTPATIENT)
Age: 53
End: 2023-10-31

## 2023-11-02 ENCOUNTER — EMERGENCY (EMERGENCY)
Facility: HOSPITAL | Age: 53
LOS: 0 days | Discharge: HOME | End: 2023-11-02
Attending: STUDENT IN AN ORGANIZED HEALTH CARE EDUCATION/TRAINING PROGRAM
Payer: COMMERCIAL

## 2023-11-02 VITALS
SYSTOLIC BLOOD PRESSURE: 121 MMHG | HEART RATE: 127 BPM | DIASTOLIC BLOOD PRESSURE: 90 MMHG | TEMPERATURE: 102 F | RESPIRATION RATE: 18 BRPM | HEIGHT: 66 IN | OXYGEN SATURATION: 99 % | WEIGHT: 160.06 LBS

## 2023-11-02 VITALS
HEART RATE: 96 BPM | TEMPERATURE: 100 F | SYSTOLIC BLOOD PRESSURE: 111 MMHG | OXYGEN SATURATION: 94 % | DIASTOLIC BLOOD PRESSURE: 70 MMHG | RESPIRATION RATE: 18 BRPM

## 2023-11-02 DIAGNOSIS — J84.9 INTERSTITIAL PULMONARY DISEASE, UNSPECIFIED: ICD-10-CM

## 2023-11-02 DIAGNOSIS — K22.0 ACHALASIA OF CARDIA: ICD-10-CM

## 2023-11-02 DIAGNOSIS — R09.81 NASAL CONGESTION: ICD-10-CM

## 2023-11-02 DIAGNOSIS — Z20.822 CONTACT WITH AND (SUSPECTED) EXPOSURE TO COVID-19: ICD-10-CM

## 2023-11-02 DIAGNOSIS — R50.9 FEVER, UNSPECIFIED: ICD-10-CM

## 2023-11-02 DIAGNOSIS — B34.9 VIRAL INFECTION, UNSPECIFIED: ICD-10-CM

## 2023-11-02 DIAGNOSIS — J02.9 ACUTE PHARYNGITIS, UNSPECIFIED: ICD-10-CM

## 2023-11-02 LAB
ALBUMIN SERPL ELPH-MCNC: 4.9 G/DL — SIGNIFICANT CHANGE UP (ref 3.5–5.2)
ALBUMIN SERPL ELPH-MCNC: 4.9 G/DL — SIGNIFICANT CHANGE UP (ref 3.5–5.2)
ALP SERPL-CCNC: 66 U/L — SIGNIFICANT CHANGE UP (ref 30–115)
ALP SERPL-CCNC: 66 U/L — SIGNIFICANT CHANGE UP (ref 30–115)
ALT FLD-CCNC: 22 U/L — SIGNIFICANT CHANGE UP (ref 0–41)
ALT FLD-CCNC: 22 U/L — SIGNIFICANT CHANGE UP (ref 0–41)
ANION GAP SERPL CALC-SCNC: 14 MMOL/L — SIGNIFICANT CHANGE UP (ref 7–14)
ANION GAP SERPL CALC-SCNC: 14 MMOL/L — SIGNIFICANT CHANGE UP (ref 7–14)
AST SERPL-CCNC: 20 U/L — SIGNIFICANT CHANGE UP (ref 0–41)
AST SERPL-CCNC: 20 U/L — SIGNIFICANT CHANGE UP (ref 0–41)
BASOPHILS # BLD AUTO: 0.04 K/UL — SIGNIFICANT CHANGE UP (ref 0–0.2)
BASOPHILS # BLD AUTO: 0.04 K/UL — SIGNIFICANT CHANGE UP (ref 0–0.2)
BASOPHILS NFR BLD AUTO: 0.3 % — SIGNIFICANT CHANGE UP (ref 0–1)
BASOPHILS NFR BLD AUTO: 0.3 % — SIGNIFICANT CHANGE UP (ref 0–1)
BILIRUB SERPL-MCNC: 0.9 MG/DL — SIGNIFICANT CHANGE UP (ref 0.2–1.2)
BILIRUB SERPL-MCNC: 0.9 MG/DL — SIGNIFICANT CHANGE UP (ref 0.2–1.2)
BUN SERPL-MCNC: 21 MG/DL — HIGH (ref 10–20)
BUN SERPL-MCNC: 21 MG/DL — HIGH (ref 10–20)
CALCIUM SERPL-MCNC: 9.4 MG/DL — SIGNIFICANT CHANGE UP (ref 8.4–10.5)
CALCIUM SERPL-MCNC: 9.4 MG/DL — SIGNIFICANT CHANGE UP (ref 8.4–10.5)
CHLORIDE SERPL-SCNC: 100 MMOL/L — SIGNIFICANT CHANGE UP (ref 98–110)
CHLORIDE SERPL-SCNC: 100 MMOL/L — SIGNIFICANT CHANGE UP (ref 98–110)
CO2 SERPL-SCNC: 24 MMOL/L — SIGNIFICANT CHANGE UP (ref 17–32)
CO2 SERPL-SCNC: 24 MMOL/L — SIGNIFICANT CHANGE UP (ref 17–32)
CREAT SERPL-MCNC: 1 MG/DL — SIGNIFICANT CHANGE UP (ref 0.7–1.5)
CREAT SERPL-MCNC: 1 MG/DL — SIGNIFICANT CHANGE UP (ref 0.7–1.5)
EGFR: 89 ML/MIN/1.73M2 — SIGNIFICANT CHANGE UP
EGFR: 89 ML/MIN/1.73M2 — SIGNIFICANT CHANGE UP
EOSINOPHIL # BLD AUTO: 0.01 K/UL — SIGNIFICANT CHANGE UP (ref 0–0.7)
EOSINOPHIL # BLD AUTO: 0.01 K/UL — SIGNIFICANT CHANGE UP (ref 0–0.7)
EOSINOPHIL NFR BLD AUTO: 0.1 % — SIGNIFICANT CHANGE UP (ref 0–8)
EOSINOPHIL NFR BLD AUTO: 0.1 % — SIGNIFICANT CHANGE UP (ref 0–8)
FLUAV AG NPH QL: SIGNIFICANT CHANGE UP
FLUAV AG NPH QL: SIGNIFICANT CHANGE UP
FLUBV AG NPH QL: SIGNIFICANT CHANGE UP
FLUBV AG NPH QL: SIGNIFICANT CHANGE UP
GLUCOSE SERPL-MCNC: 89 MG/DL — SIGNIFICANT CHANGE UP (ref 70–99)
GLUCOSE SERPL-MCNC: 89 MG/DL — SIGNIFICANT CHANGE UP (ref 70–99)
HCT VFR BLD CALC: 46.4 % — SIGNIFICANT CHANGE UP (ref 42–52)
HCT VFR BLD CALC: 46.4 % — SIGNIFICANT CHANGE UP (ref 42–52)
HGB BLD-MCNC: 15.9 G/DL — SIGNIFICANT CHANGE UP (ref 14–18)
HGB BLD-MCNC: 15.9 G/DL — SIGNIFICANT CHANGE UP (ref 14–18)
IMM GRANULOCYTES NFR BLD AUTO: 0.3 % — SIGNIFICANT CHANGE UP (ref 0.1–0.3)
IMM GRANULOCYTES NFR BLD AUTO: 0.3 % — SIGNIFICANT CHANGE UP (ref 0.1–0.3)
LYMPHOCYTES # BLD AUTO: 1.37 K/UL — SIGNIFICANT CHANGE UP (ref 1.2–3.4)
LYMPHOCYTES # BLD AUTO: 1.37 K/UL — SIGNIFICANT CHANGE UP (ref 1.2–3.4)
LYMPHOCYTES # BLD AUTO: 11.1 % — LOW (ref 20.5–51.1)
LYMPHOCYTES # BLD AUTO: 11.1 % — LOW (ref 20.5–51.1)
MCHC RBC-ENTMCNC: 32.6 PG — HIGH (ref 27–31)
MCHC RBC-ENTMCNC: 32.6 PG — HIGH (ref 27–31)
MCHC RBC-ENTMCNC: 34.3 G/DL — SIGNIFICANT CHANGE UP (ref 32–37)
MCHC RBC-ENTMCNC: 34.3 G/DL — SIGNIFICANT CHANGE UP (ref 32–37)
MCV RBC AUTO: 95.1 FL — HIGH (ref 80–94)
MCV RBC AUTO: 95.1 FL — HIGH (ref 80–94)
MONOCYTES # BLD AUTO: 0.96 K/UL — HIGH (ref 0.1–0.6)
MONOCYTES # BLD AUTO: 0.96 K/UL — HIGH (ref 0.1–0.6)
MONOCYTES NFR BLD AUTO: 7.8 % — SIGNIFICANT CHANGE UP (ref 1.7–9.3)
MONOCYTES NFR BLD AUTO: 7.8 % — SIGNIFICANT CHANGE UP (ref 1.7–9.3)
NEUTROPHILS # BLD AUTO: 9.94 K/UL — HIGH (ref 1.4–6.5)
NEUTROPHILS # BLD AUTO: 9.94 K/UL — HIGH (ref 1.4–6.5)
NEUTROPHILS NFR BLD AUTO: 80.4 % — HIGH (ref 42.2–75.2)
NEUTROPHILS NFR BLD AUTO: 80.4 % — HIGH (ref 42.2–75.2)
NRBC # BLD: 0 /100 WBCS — SIGNIFICANT CHANGE UP (ref 0–0)
NRBC # BLD: 0 /100 WBCS — SIGNIFICANT CHANGE UP (ref 0–0)
PLATELET # BLD AUTO: 179 K/UL — SIGNIFICANT CHANGE UP (ref 130–400)
PLATELET # BLD AUTO: 179 K/UL — SIGNIFICANT CHANGE UP (ref 130–400)
PMV BLD: 11 FL — HIGH (ref 7.4–10.4)
PMV BLD: 11 FL — HIGH (ref 7.4–10.4)
POTASSIUM SERPL-MCNC: 4.6 MMOL/L — SIGNIFICANT CHANGE UP (ref 3.5–5)
POTASSIUM SERPL-MCNC: 4.6 MMOL/L — SIGNIFICANT CHANGE UP (ref 3.5–5)
POTASSIUM SERPL-SCNC: 4.6 MMOL/L — SIGNIFICANT CHANGE UP (ref 3.5–5)
POTASSIUM SERPL-SCNC: 4.6 MMOL/L — SIGNIFICANT CHANGE UP (ref 3.5–5)
PROT SERPL-MCNC: 7.7 G/DL — SIGNIFICANT CHANGE UP (ref 6–8)
PROT SERPL-MCNC: 7.7 G/DL — SIGNIFICANT CHANGE UP (ref 6–8)
RBC # BLD: 4.88 M/UL — SIGNIFICANT CHANGE UP (ref 4.7–6.1)
RBC # BLD: 4.88 M/UL — SIGNIFICANT CHANGE UP (ref 4.7–6.1)
RBC # FLD: 13.4 % — SIGNIFICANT CHANGE UP (ref 11.5–14.5)
RBC # FLD: 13.4 % — SIGNIFICANT CHANGE UP (ref 11.5–14.5)
RSV RNA NPH QL NAA+NON-PROBE: SIGNIFICANT CHANGE UP
RSV RNA NPH QL NAA+NON-PROBE: SIGNIFICANT CHANGE UP
SARS-COV-2 RNA SPEC QL NAA+PROBE: SIGNIFICANT CHANGE UP
SARS-COV-2 RNA SPEC QL NAA+PROBE: SIGNIFICANT CHANGE UP
SODIUM SERPL-SCNC: 138 MMOL/L — SIGNIFICANT CHANGE UP (ref 135–146)
SODIUM SERPL-SCNC: 138 MMOL/L — SIGNIFICANT CHANGE UP (ref 135–146)
TROPONIN T SERPL-MCNC: <0.01 NG/ML — SIGNIFICANT CHANGE UP
TROPONIN T SERPL-MCNC: <0.01 NG/ML — SIGNIFICANT CHANGE UP
WBC # BLD: 12.36 K/UL — HIGH (ref 4.8–10.8)
WBC # BLD: 12.36 K/UL — HIGH (ref 4.8–10.8)
WBC # FLD AUTO: 12.36 K/UL — HIGH (ref 4.8–10.8)
WBC # FLD AUTO: 12.36 K/UL — HIGH (ref 4.8–10.8)

## 2023-11-02 PROCEDURE — 93010 ELECTROCARDIOGRAM REPORT: CPT

## 2023-11-02 PROCEDURE — 71046 X-RAY EXAM CHEST 2 VIEWS: CPT | Mod: 26

## 2023-11-02 PROCEDURE — 84484 ASSAY OF TROPONIN QUANT: CPT

## 2023-11-02 PROCEDURE — 85025 COMPLETE CBC W/AUTO DIFF WBC: CPT

## 2023-11-02 PROCEDURE — 71046 X-RAY EXAM CHEST 2 VIEWS: CPT

## 2023-11-02 PROCEDURE — 80053 COMPREHEN METABOLIC PANEL: CPT

## 2023-11-02 PROCEDURE — 99285 EMERGENCY DEPT VISIT HI MDM: CPT

## 2023-11-02 PROCEDURE — 93005 ELECTROCARDIOGRAM TRACING: CPT

## 2023-11-02 PROCEDURE — 36415 COLL VENOUS BLD VENIPUNCTURE: CPT

## 2023-11-02 PROCEDURE — 96374 THER/PROPH/DIAG INJ IV PUSH: CPT

## 2023-11-02 PROCEDURE — 0241U: CPT

## 2023-11-02 PROCEDURE — 99285 EMERGENCY DEPT VISIT HI MDM: CPT | Mod: 25

## 2023-11-02 RX ORDER — SODIUM CHLORIDE 9 MG/ML
1000 INJECTION INTRAMUSCULAR; INTRAVENOUS; SUBCUTANEOUS ONCE
Refills: 0 | Status: COMPLETED | OUTPATIENT
Start: 2023-11-02 | End: 2023-11-02

## 2023-11-02 RX ORDER — DEXAMETHASONE 0.5 MG/5ML
10 ELIXIR ORAL ONCE
Refills: 0 | Status: COMPLETED | OUTPATIENT
Start: 2023-11-02 | End: 2023-11-02

## 2023-11-02 RX ORDER — KETOROLAC TROMETHAMINE 30 MG/ML
30 SYRINGE (ML) INJECTION ONCE
Refills: 0 | Status: DISCONTINUED | OUTPATIENT
Start: 2023-11-02 | End: 2023-11-02

## 2023-11-02 RX ADMIN — SODIUM CHLORIDE 1000 MILLILITER(S): 9 INJECTION INTRAMUSCULAR; INTRAVENOUS; SUBCUTANEOUS at 12:10

## 2023-11-02 RX ADMIN — Medication 10 MILLIGRAM(S): at 12:10

## 2023-11-02 RX ADMIN — Medication 30 MILLIGRAM(S): at 12:10

## 2023-11-02 NOTE — ED PROVIDER NOTE - CLINICAL SUMMARY MEDICAL DECISION MAKING FREE TEXT BOX
53 year old male with a history of interstitial lung disease and achalasia presents to the ED with fever. Patient reports sore throat, congestion, generalized weakness and fatigue and fever x 3 days. Yesterday fever was tmax 102F and he was seen at [facility] had negative covid and flu swab and negative chest XR advised to come to ED for persistent fevers. here intiially febrile and tachycardic no source of infection, labs showed slight leukocytosis cxr no infiltrate. pt felt better, HR and temperature normalized by ivf and Toradol. viral panel still negative. pt discharged with pmd follow up and return precautions

## 2023-11-02 NOTE — ED PROVIDER NOTE - PATIENT'S PREFERRED PRONOUN
PAST MEDICAL HISTORY:  Anxiety     Autism     Borderline personality disorder     Depression     
Him/He

## 2023-11-02 NOTE — ED PROVIDER NOTE - NSFOLLOWUPINSTRUCTIONS_ED_ALL_ED_FT
Please follow up with your primary care provider in 1 week to ensure you are improving. For fever and generalized discomfort you may take Ibuprofen 600mg every 8 hours as needed alternating with Tylenol 1000mg every 8 hours as needed.       VIRAL SYNDROME - Discharge Care     Viral Syndrome    WHAT YOU NEED TO KNOW:    Viral syndrome is a term used for symptoms of an infection caused by a virus. Viruses are spread easily from person to person through the air and on shared items. An illness caused by a virus usually goes away in 10 to 14 days without treatment. Antibiotics are not given for a viral infection.     DISCHARGE INSTRUCTIONS:    Call 911 for the following:     You have a seizure.       You cannot be woken.       You have chest pain or trouble breathing.     Seek care immediately if:     You have a stiff neck, a bad headache, and sensitivity to light.       You feel weak, dizzy, or confused.       You stop urinating or urinate a lot less than normal.       You cough up blood or thick, yellow or green, mucus.       You have severe abdominal pain or your abdomen is larger than usual.     Contact your healthcare provider if:     Your symptoms do not get better with treatment, or get worse, after 3 days.       You have a rash or ear pain.       You have burning when you urinate.       You have questions or concerns about your condition or care.    Medicines: You may need any of the following:     Acetaminophen decreases pain and fever. It is available without a doctor's order. Ask how much medicine to take and how often to take it. Follow directions. Acetaminophen can cause liver damage if not taken correctly.       NSAIDs, such as ibuprofen, help decrease swelling, pain, and fever. NSAIDs can cause stomach bleeding or kidney problems in certain people. If you take blood thinner medicine, always ask your healthcare provider if NSAIDs are safe for you. Always read the medicine label and follow directions.      Cold medicine helps decrease swelling, control a cough, and relieve chest or nasal congestion.       Saline nasal spray helps decrease nasal congestion.       Take your medicine as directed. Contact your healthcare provider if you think your medicine is not helping or if you have side effects. Tell him of her if you are allergic to any medicine. Keep a list of the medicines, vitamins, and herbs you take. Include the amounts, and when and why you take them. Bring the list or the pill bottles to follow-up visits. Carry your medicine list with you in case of an emergency.    Manage your symptoms:     Drink liquids as directed to prevent dehydration. Ask how much liquid to drink each day and which liquids are best for you. Ask if you should drink an oral rehydration solution (ORS). An ORS has the right amounts of water, salts, and sugar you need to replace body fluids. This may help prevent dehydration caused by vomiting or diarrhea. Do not drink liquids with caffeine. Drinks with caffeine can make dehydration worse.       Get plenty of rest to help your body heal. Take naps throughout the day. Ask your healthcare provider when you can return to work and your normal activities.       Use a cool mist humidifier to help you breathe easier if you have nasal or chest congestion. Ask your healthcare provider how to use a cool mist humidifier.       Eat honey or use cough drops to help decrease throat discomfort. Ask your healthcare provider how much honey you should eat each day. Cough drops are available without a doctor's order. Follow directions for taking cough drops.       Do not smoke and stay away from others who smoke. Nicotine and other chemicals in cigarettes and cigars can cause lung damage. Smoking can also delay healing. Ask your healthcare provider for information if you currently smoke and need help to quit. E-cigarettes or smokeless tobacco still contain nicotine. Talk to your healthcare provider before you use these products.       Wash your hands frequently to prevent the spread of germs to others. Use soap and water. Use gel hand  when soap and water are not available. Wash your hands after you use the bathroom, cough, or sneeze. Wash your hands before you prepare or eat food.     Follow up with your healthcare provider as directed: Write down your questions so you remember to ask them during your visits.

## 2023-11-02 NOTE — ED PROVIDER NOTE - OBJECTIVE STATEMENT
53 year old male with a history of interstitial lung disease and achalasia presents to the ED with fever. Patient reports sore throat, congestion, generalized weakness and fatigue and fever x 3 days. Yesterday fever was tmax 102F and he was seen at [facility] [place] had negative covid and flu swab and negative chest XR advised to come to ED for persistent fevers. At this time he feels generally ill without other localizing symptoms of chest pain, shortness of breath, abdominal pain, nausea, vomiting, diarrhea, constipation, dysuria, hematuria, lower extremity swelling, rash.

## 2023-11-02 NOTE — ED PROVIDER NOTE - ATTENDING APP SHARED VISIT CONTRIBUTION OF CARE
I have personally performed a history and physical exam on this patient and personally directed the management of the patient.  53 year old male with a history of interstitial lung disease and achalasia presents to the ED with fever. Patient reports sore throat, congestion, generalized weakness and fatigue and fever x 3 days. Yesterday fever was tmax 102F and he was seen at [facility] [place] had negative covid and flu swab and negative chest XR advised to come to ED for persistent fevers  CON: appears stated age, febrile, no acute distress, HENMT: normocephalic, atraumatic, anicteric, no conjunctival injection,  CV: tachycardic regular rhythm, distal pulses intact, RESP: no acute respiratory distress, no stridor, breathing comfortably on RA , GI:  soft, nontender, no rebound, no guarding, SKIN: no wounds MSK: no deformities, NEURO: no gross motor or sensory deficit Psychiatric: appropriate mood, appropriate affect  will send labs, cxr ivf Toradol, decadron and viral panel and reevaluate

## 2023-11-07 ENCOUNTER — APPOINTMENT (OUTPATIENT)
Dept: PULMONOLOGY | Facility: CLINIC | Age: 53
End: 2023-11-07
Payer: COMMERCIAL

## 2023-11-07 VITALS
BODY MASS INDEX: 25.71 KG/M2 | SYSTOLIC BLOOD PRESSURE: 122 MMHG | RESPIRATION RATE: 14 BRPM | OXYGEN SATURATION: 96 % | DIASTOLIC BLOOD PRESSURE: 82 MMHG | HEART RATE: 75 BPM | WEIGHT: 160 LBS | HEIGHT: 66 IN

## 2023-11-07 DIAGNOSIS — R91.1 SOLITARY PULMONARY NODULE: ICD-10-CM

## 2023-11-07 DIAGNOSIS — J45.909 UNSPECIFIED ASTHMA, UNCOMPLICATED: ICD-10-CM

## 2023-11-07 DIAGNOSIS — Z01.818 ENCOUNTER FOR OTHER PREPROCEDURAL EXAMINATION: ICD-10-CM

## 2023-11-07 PROCEDURE — 99214 OFFICE O/P EST MOD 30 MIN: CPT

## 2023-11-10 ENCOUNTER — RX RENEWAL (OUTPATIENT)
Age: 53
End: 2023-11-10

## 2024-01-03 ENCOUNTER — RX RENEWAL (OUTPATIENT)
Age: 54
End: 2024-01-03

## 2024-02-02 ENCOUNTER — RX RENEWAL (OUTPATIENT)
Age: 54
End: 2024-02-02

## 2024-02-10 ENCOUNTER — NON-APPOINTMENT (OUTPATIENT)
Age: 54
End: 2024-02-10

## 2024-02-16 ENCOUNTER — APPOINTMENT (OUTPATIENT)
Dept: ORTHOPEDIC SURGERY | Facility: CLINIC | Age: 54
End: 2024-02-16
Payer: COMMERCIAL

## 2024-02-16 DIAGNOSIS — M54.12 RADICULOPATHY, CERVICAL REGION: ICD-10-CM

## 2024-02-16 PROCEDURE — 72050 X-RAY EXAM NECK SPINE 4/5VWS: CPT

## 2024-02-16 PROCEDURE — 72110 X-RAY EXAM L-2 SPINE 4/>VWS: CPT

## 2024-02-16 PROCEDURE — 99203 OFFICE O/P NEW LOW 30 MIN: CPT | Mod: 25

## 2024-02-16 NOTE — IMAGING
[de-identified] : TTP midline cervical spine and paraspinal musculature   Strength                                                                     Deltoid   Right: 5/5; Left: 5/5                      Biceps   Right: 5/5; Left: 5/5                   Triceps        Right: 5/5; Left: 5/5                                 Wrist Extensors     Right: 5/5; Left: 5/5 Finger Flexors     Right: 5/5; Left: 5/5 IO    Right: 5/5; Left: 5/5  Sensation C5   Right: 2/2; Left: 2/2 C6   Right: 2/2; Left: 2/2 C7   Right: 2/2; Left: 2/2 C8   Right: 2/2; Left: 2/2 T1   Right: 2/2; Left: 2/2  Reflexes Biceps   Right: 2+; Left 2+ Triceps   Right: 2+; Left 2+ Gonzáles's  Right: Negative; L: Negative  TTP midline spine and paraspinal musculature  Strength                                          Hip flexor   Right: 5/5; Left: 5/5                              Knee extensor     Right: 5/5; Left: 5/5                      Ankle dorsiflexion   Right: 5/5; Left: 5/5                   EHL           Right: 5/5; Left: 5/5                                 Ankle plantarflexion       Right: 5/5; Left: 5/5  Sensation L1   Right: 2/2; Left: 2/2 L2   Right: 2/2; Left: 2/2 L3   Right: 2/2; Left: 2/2 L4   Right: 2/2; Left: 2/2 L5   Right: 2/2; Left: 2/2 S1   Right: 2/2; Left: 2/2  Reflexes Patella   Right: 2+; Left 2+ Achilles   Right: 2+; Left 2+ Clonus  Right: absent; L: absent

## 2024-02-16 NOTE — HISTORY OF PRESENT ILLNESS
[de-identified] : 53-year-old male presents to me with neck and low back pain.  This is been going for 2 weeks.  Neck pain radiates into his arms.  The low back pain does not radiate down his legs.  No loss of bladder or bowel.  He has not done physical therapy.  He is giving prednisone by his primary care physician and the patient started to improve almost immediately.  He is doing much better than he was 2 weeks ago.  He has not something like this in the past.  He had no injury.

## 2024-02-16 NOTE — DATA REVIEWED
[FreeTextEntry1] : I obtained and reviewed AP lateral flexion and cervical x-rays.  The patient has some very mild disc space collapse at C5-6 with anterior osteophyte.  Obtained reviewed AP lateral extension lumbar x-rays.  No instability no fractures no dislocations mild L5-S1 arthritis and disc base narrowing.

## 2024-02-16 NOTE — DISCUSSION/SUMMARY
[de-identified] : 53-year-old male with cervical radiculopathy and lumbar spine.  I am recommending he begin physical therapy he can continue the prednisone as prescribed by his primary care physician follow-up with me as needed if the physical therapy does not relieve his pain.

## 2024-03-11 ENCOUNTER — APPOINTMENT (OUTPATIENT)
Dept: OTOLARYNGOLOGY | Facility: CLINIC | Age: 54
End: 2024-03-11
Payer: COMMERCIAL

## 2024-03-11 VITALS
WEIGHT: 160 LBS | OXYGEN SATURATION: 98 % | BODY MASS INDEX: 25.71 KG/M2 | HEART RATE: 85 BPM | TEMPERATURE: 98.2 F | SYSTOLIC BLOOD PRESSURE: 129 MMHG | DIASTOLIC BLOOD PRESSURE: 80 MMHG | HEIGHT: 66 IN

## 2024-03-11 DIAGNOSIS — H93.13 TINNITUS, BILATERAL: ICD-10-CM

## 2024-03-11 DIAGNOSIS — J31.0 CHRONIC RHINITIS: ICD-10-CM

## 2024-03-11 DIAGNOSIS — H90.3 SENSORINEURAL HEARING LOSS, BILATERAL: ICD-10-CM

## 2024-03-11 DIAGNOSIS — J34.2 DEVIATED NASAL SEPTUM: ICD-10-CM

## 2024-03-11 DIAGNOSIS — J38.2 NODULES OF VOCAL CORDS: ICD-10-CM

## 2024-03-11 PROCEDURE — 92557 COMPREHENSIVE HEARING TEST: CPT

## 2024-03-11 PROCEDURE — 92504 EAR MICROSCOPY EXAMINATION: CPT

## 2024-03-11 PROCEDURE — 99214 OFFICE O/P EST MOD 30 MIN: CPT | Mod: 25

## 2024-03-11 PROCEDURE — 31231 NASAL ENDOSCOPY DX: CPT

## 2024-03-11 PROCEDURE — 92550 TYMPANOMETRY & REFLEX THRESH: CPT | Mod: 52

## 2024-03-11 RX ORDER — FAMOTIDINE 20 MG/1
20 TABLET, FILM COATED ORAL
Qty: 180 | Refills: 1 | Status: ACTIVE | COMMUNITY
Start: 2023-04-06 | End: 1900-01-01

## 2024-03-11 RX ORDER — IPRATROPIUM BROMIDE 42 UG/1
0.06 SPRAY NASAL
Qty: 1 | Refills: 2 | Status: ACTIVE | COMMUNITY
Start: 2024-03-11 | End: 1900-01-01

## 2024-03-11 NOTE — ASSESSMENT
[FreeTextEntry1] : We discussed possible nasal surgeries and will hold off for now. Trial ipratropium  Discussed general tinnitus treatment including masking, amplification when appropriate, alternative treatments, helpful smartphone applications, CBT and OTC medications. An HAE is declined.

## 2024-03-11 NOTE — HISTORY OF PRESENT ILLNESS
[de-identified] : Lots of very runny nose w/o obvious allergy sxs for many months- this is chronic over time. Hx of a lot of sinus infections which can last for months but none since middle of last yr. He's using some kind of nasal spray but doesn't know the name.  His L ear has been ringing loudly for a few weeks- less so on the R. Ongoing ear clogging periodically at night/when he wakes up. No baseline HL. Lots of HL in the family.  Chronic throat irritation/globus & clearing/PND sx and achalasia- he saw Dr. Bright to schedule a POEM but then changed insurance. His hearburn is mostly controlled on omeprazole 40 mg which he takes at variable times of day. He ran out of famotidine.

## 2024-03-11 NOTE — PROCEDURE
[FreeTextEntry6] : Indication: requirement for exam not possible via anterior rhinoscopy; chronic nasal obstruction After verbal consent and the administration of an aerosolized oxymetazoline/lidocaine mix, examination was performed with a flexible endoscope attached to a video monitoring system. Findings: Septum: caudal L NSD Mucosa: normal Polyposis: not present Inferior turbinates: large, pos Afrin test Middle and superior turbinates: normal Inferior meatus: unremarkable Middle meatus: unremarkable Superior meatus: unremarkable Speno-ethmoidal recess: unremarkable Nasopharynx: unremarkable Secretions: unremarkable Other findings: none [de-identified] : Indication: requirement for exam not possible via anterior examination; dysphagia After verbal consent and the administration of an aerosolized oxymetazoline/lidocaine mix, examination was performed with a flexible endoscope placed through the nose which was attached to a video monitoring system. Findings: Nasopharynx: unremarkable Soft palate, lateral and posterior pharyngeal walls: unremarkable Base of tongue & lingual tonsil: minimal retrodisplacement Vallecula: unremarkable Epiglottis: unremarkable Piriform sinuses and pharyngoesophageal junction: unremarkable Arytenoids and AE folds: mild interarytenoid edema Ventricle and false vocal folds: unremarkable True vocal folds: Smooth free edge; surface without ectasias or edema; normal movement bilaterally with good apposition in phonation Visible subglottis: unremarkable Narrow-band imaging: not used Other findings: none

## 2024-03-11 NOTE — DATA REVIEWED
[de-identified] : 4/23: nl to severe SNHL AU;  AU - Immitance testing w/ type A AU 3/24: stable hearing;  AU  [de-identified] : 10/23 CT sinus reviewed primarily (not read yet): mild bilat max dz, severe L DNS [de-identified] : EGD 7/20: tortuous/diffuse dilation of the esophageal lumen\par  HRM 8/20: absent contractility

## 2024-03-11 NOTE — PHYSICAL EXAM
[] : septum deviated to the left [Removed] : palatine tonsils previously removed [Nasal Endoscopy Performed] : nasal endoscopy was performed, see procedure section for findings [de-identified] : ess neg mod [place] bilat despite borderline narrow INV angles [Laryngoscopy Performed] : laryngoscopy was performed, see procedure section for findings [Binocular Microscopic Exam] : Binocular microscopic exam was performed [Normal] : the left middle ear was normal

## 2024-03-25 ENCOUNTER — RX RENEWAL (OUTPATIENT)
Age: 54
End: 2024-03-25

## 2024-05-13 ENCOUNTER — RX RENEWAL (OUTPATIENT)
Age: 54
End: 2024-05-13

## 2024-05-13 RX ORDER — OMEPRAZOLE 40 MG/1
40 CAPSULE, DELAYED RELEASE ORAL
Qty: 30 | Refills: 2 | Status: ACTIVE | COMMUNITY
Start: 2023-05-16 | End: 1900-01-01

## 2024-05-14 RX ORDER — FLUTICASONE PROPIONATE 50 UG/1
50 SPRAY, METERED NASAL
Qty: 48 | Refills: 1 | Status: ACTIVE | COMMUNITY
Start: 2023-10-12 | End: 1900-01-01

## 2024-06-01 ENCOUNTER — NON-APPOINTMENT (OUTPATIENT)
Age: 54
End: 2024-06-01

## 2024-06-03 ENCOUNTER — EMERGENCY (EMERGENCY)
Facility: HOSPITAL | Age: 54
LOS: 0 days | Discharge: HOME | End: 2024-06-03
Attending: EMERGENCY MEDICINE
Payer: COMMERCIAL

## 2024-06-03 VITALS
DIASTOLIC BLOOD PRESSURE: 85 MMHG | HEART RATE: 82 BPM | WEIGHT: 160.06 LBS | TEMPERATURE: 99 F | SYSTOLIC BLOOD PRESSURE: 138 MMHG | OXYGEN SATURATION: 97 % | RESPIRATION RATE: 18 BRPM | HEIGHT: 66 IN

## 2024-06-03 DIAGNOSIS — R06.02 SHORTNESS OF BREATH: ICD-10-CM

## 2024-06-03 DIAGNOSIS — R53.1 WEAKNESS: ICD-10-CM

## 2024-06-03 DIAGNOSIS — J84.9 INTERSTITIAL PULMONARY DISEASE, UNSPECIFIED: ICD-10-CM

## 2024-06-03 DIAGNOSIS — R07.89 OTHER CHEST PAIN: ICD-10-CM

## 2024-06-03 LAB
ALBUMIN SERPL ELPH-MCNC: 4.8 G/DL — SIGNIFICANT CHANGE UP (ref 3.5–5.2)
ALP SERPL-CCNC: 56 U/L — SIGNIFICANT CHANGE UP (ref 30–115)
ALT FLD-CCNC: 19 U/L — SIGNIFICANT CHANGE UP (ref 0–41)
ANION GAP SERPL CALC-SCNC: 12 MMOL/L — SIGNIFICANT CHANGE UP (ref 7–14)
AST SERPL-CCNC: 19 U/L — SIGNIFICANT CHANGE UP (ref 0–41)
BASE EXCESS BLDV CALC-SCNC: 0.3 MMOL/L — SIGNIFICANT CHANGE UP (ref -2–3)
BASOPHILS # BLD AUTO: 0.04 K/UL — SIGNIFICANT CHANGE UP (ref 0–0.2)
BASOPHILS NFR BLD AUTO: 0.6 % — SIGNIFICANT CHANGE UP (ref 0–1)
BILIRUB SERPL-MCNC: 0.6 MG/DL — SIGNIFICANT CHANGE UP (ref 0.2–1.2)
BUN SERPL-MCNC: 19 MG/DL — SIGNIFICANT CHANGE UP (ref 10–20)
CA-I SERPL-SCNC: 1.22 MMOL/L — SIGNIFICANT CHANGE UP (ref 1.15–1.33)
CALCIUM SERPL-MCNC: 9.9 MG/DL — SIGNIFICANT CHANGE UP (ref 8.4–10.4)
CHLORIDE SERPL-SCNC: 104 MMOL/L — SIGNIFICANT CHANGE UP (ref 98–110)
CO2 SERPL-SCNC: 23 MMOL/L — SIGNIFICANT CHANGE UP (ref 17–32)
CREAT SERPL-MCNC: 1.1 MG/DL — SIGNIFICANT CHANGE UP (ref 0.7–1.5)
EGFR: 79 ML/MIN/1.73M2 — SIGNIFICANT CHANGE UP
EOSINOPHIL # BLD AUTO: 0.06 K/UL — SIGNIFICANT CHANGE UP (ref 0–0.7)
EOSINOPHIL NFR BLD AUTO: 0.8 % — SIGNIFICANT CHANGE UP (ref 0–8)
GAS PNL BLDV: 136 MMOL/L — SIGNIFICANT CHANGE UP (ref 136–145)
GAS PNL BLDV: SIGNIFICANT CHANGE UP
GLUCOSE SERPL-MCNC: 87 MG/DL — SIGNIFICANT CHANGE UP (ref 70–99)
HCO3 BLDV-SCNC: 26 MMOL/L — SIGNIFICANT CHANGE UP (ref 22–29)
HCT VFR BLD CALC: 44.6 % — SIGNIFICANT CHANGE UP (ref 42–52)
HGB BLD-MCNC: 15.9 G/DL — SIGNIFICANT CHANGE UP (ref 14–18)
IMM GRANULOCYTES NFR BLD AUTO: 0.3 % — SIGNIFICANT CHANGE UP (ref 0.1–0.3)
LACTATE BLDV-MCNC: 2.3 MMOL/L — HIGH (ref 0.5–2)
LYMPHOCYTES # BLD AUTO: 2.15 K/UL — SIGNIFICANT CHANGE UP (ref 1.2–3.4)
LYMPHOCYTES # BLD AUTO: 30.1 % — SIGNIFICANT CHANGE UP (ref 20.5–51.1)
MCHC RBC-ENTMCNC: 33.1 PG — HIGH (ref 27–31)
MCHC RBC-ENTMCNC: 35.7 G/DL — SIGNIFICANT CHANGE UP (ref 32–37)
MCV RBC AUTO: 92.7 FL — SIGNIFICANT CHANGE UP (ref 80–94)
MONOCYTES # BLD AUTO: 0.49 K/UL — SIGNIFICANT CHANGE UP (ref 0.1–0.6)
MONOCYTES NFR BLD AUTO: 6.9 % — SIGNIFICANT CHANGE UP (ref 1.7–9.3)
NEUTROPHILS # BLD AUTO: 4.39 K/UL — SIGNIFICANT CHANGE UP (ref 1.4–6.5)
NEUTROPHILS NFR BLD AUTO: 61.3 % — SIGNIFICANT CHANGE UP (ref 42.2–75.2)
NRBC # BLD: 0 /100 WBCS — SIGNIFICANT CHANGE UP (ref 0–0)
PCO2 BLDV: 45 MMHG — SIGNIFICANT CHANGE UP (ref 42–55)
PH BLDV: 7.37 — SIGNIFICANT CHANGE UP (ref 7.32–7.43)
PLATELET # BLD AUTO: 196 K/UL — SIGNIFICANT CHANGE UP (ref 130–400)
PMV BLD: 11.2 FL — HIGH (ref 7.4–10.4)
PO2 BLDV: 34 MMHG — SIGNIFICANT CHANGE UP (ref 25–45)
POTASSIUM BLDV-SCNC: 4.2 MMOL/L — SIGNIFICANT CHANGE UP (ref 3.5–5.1)
POTASSIUM SERPL-MCNC: 4.7 MMOL/L — SIGNIFICANT CHANGE UP (ref 3.5–5)
POTASSIUM SERPL-SCNC: 4.7 MMOL/L — SIGNIFICANT CHANGE UP (ref 3.5–5)
PROT SERPL-MCNC: 7.6 G/DL — SIGNIFICANT CHANGE UP (ref 6–8)
RBC # BLD: 4.81 M/UL — SIGNIFICANT CHANGE UP (ref 4.7–6.1)
RBC # FLD: 12.7 % — SIGNIFICANT CHANGE UP (ref 11.5–14.5)
SAO2 % BLDV: 53.3 % — LOW (ref 67–88)
SODIUM SERPL-SCNC: 139 MMOL/L — SIGNIFICANT CHANGE UP (ref 135–146)
TROPONIN T, HIGH SENSITIVITY RESULT: <6 NG/L — SIGNIFICANT CHANGE UP (ref 6–21)
WBC # BLD: 7.15 K/UL — SIGNIFICANT CHANGE UP (ref 4.8–10.8)
WBC # FLD AUTO: 7.15 K/UL — SIGNIFICANT CHANGE UP (ref 4.8–10.8)

## 2024-06-03 PROCEDURE — 85025 COMPLETE CBC W/AUTO DIFF WBC: CPT

## 2024-06-03 PROCEDURE — 36415 COLL VENOUS BLD VENIPUNCTURE: CPT

## 2024-06-03 PROCEDURE — 71046 X-RAY EXAM CHEST 2 VIEWS: CPT | Mod: 26

## 2024-06-03 PROCEDURE — 93005 ELECTROCARDIOGRAM TRACING: CPT

## 2024-06-03 PROCEDURE — 84295 ASSAY OF SERUM SODIUM: CPT

## 2024-06-03 PROCEDURE — 83605 ASSAY OF LACTIC ACID: CPT

## 2024-06-03 PROCEDURE — 36000 PLACE NEEDLE IN VEIN: CPT

## 2024-06-03 PROCEDURE — 93010 ELECTROCARDIOGRAM REPORT: CPT

## 2024-06-03 PROCEDURE — 71046 X-RAY EXAM CHEST 2 VIEWS: CPT

## 2024-06-03 PROCEDURE — 80053 COMPREHEN METABOLIC PANEL: CPT

## 2024-06-03 PROCEDURE — 84484 ASSAY OF TROPONIN QUANT: CPT

## 2024-06-03 PROCEDURE — 82803 BLOOD GASES ANY COMBINATION: CPT

## 2024-06-03 PROCEDURE — 84132 ASSAY OF SERUM POTASSIUM: CPT

## 2024-06-03 PROCEDURE — 99285 EMERGENCY DEPT VISIT HI MDM: CPT | Mod: 25

## 2024-06-03 PROCEDURE — 99285 EMERGENCY DEPT VISIT HI MDM: CPT

## 2024-06-03 PROCEDURE — 82330 ASSAY OF CALCIUM: CPT

## 2024-06-03 NOTE — ED PROVIDER NOTE - DIFFERENTIAL DIAGNOSIS
Chest pain, consider ACS vs PE vs dissection vs tamponade vs esophageal rupture vs pneumothorax vs pneumonia vs fluid overload Differential Diagnosis

## 2024-06-03 NOTE — ED ADULT NURSE NOTE - CHIEF COMPLAINT
Requested Prescriptions     Pending Prescriptions Disp Refills    PARoxetine (PAXIL) 40 mg tablet 30 Tab 0     Sig: Take 1 Tab by mouth daily.  Indications: Anxiousness associated with Depression     Future Appointments:  No future appointment   Last Appointment With Me:  07.08.2019    Patient needs enough pills until her scheduled appointment with Prashanth Bella on 09.05.2019.    The patient is a 54y Male complaining of chest pain.

## 2024-06-03 NOTE — ED PROVIDER NOTE - CLINICAL SUMMARY MEDICAL DECISION MAKING FREE TEXT BOX
Patient presented with chest pain and dyspnea as documented. Otherwise afebrile, Hd stable, well appearing. EKG obtained and non-ischemic without evidence of STEMI. Obtained labs which were grossly unremarkable including no significant leukocytosis, anemia, signs of dehydration/ARIANA, transaminitis or significant electrolyte abnormalities. Trop negative. Chest xray negative for pneumothorax, pneumonia, widened mediastinum, evidence of rib fractures, enlarged cardiac silhouette or any other emergent pathologies. Patient remained without recurrence of chest pain or abnormalities during monitoring in ED. Ambulatory without difficulty, tolerates PO. HEART Score 1, no PE risk factors, no concern clinically for dissection. Given the above, will discharge home with outpatient follow up. Patient agreeable with plan. Agrees to return to ED for any new or worsening symptoms.

## 2024-06-03 NOTE — ED PROVIDER NOTE - CONSIDERATION OF ADMISSION OBSERVATION
Offered obs for CCTA but patient elected for outpatient cardiology follow up. Shared decision making utilized. Consideration of Admission/Observation

## 2024-06-03 NOTE — ED PROVIDER NOTE - CARE PROVIDER_API CALL
Samir Miller Y  Interventional Cardiology  [address] [place] [address], [address]  [place] [address]  Phone: [phone]  Fax: [phone]  Follow Up Time: 1-3 Days

## 2024-06-03 NOTE — ED PROVIDER NOTE - PROGRESS NOTE DETAILS
SG: Patient notified of results.  Had shared decision-making with patient regarding admission for observation for CCTA versus discharge.  Patient prefers to be discharged and follow-up outpatient cardiology.  Return precautions elucidated.

## 2024-06-03 NOTE — ED PROVIDER NOTE - PHYSICAL EXAMINATION
CONSTITUTIONAL: NAD  SKIN: Warm dry  HEAD: NCAT  EYES: NL inspection  ENT: MMM  CARD: RRR  RESP: CTAB, no rales noted  ABD: Soft, non tender, no rebound or guarding   EXT: no pedal edema  NEURO: Grossly unremarkable  PSYCH: Cooperative, appropriate.

## 2024-06-03 NOTE — ED PROVIDER NOTE - OBJECTIVE STATEMENT
53-year-old male past medical history of ILD and achalasia coming with complaints of shortness of breath.  Patient reports for the past week he has had shortness of breath and chest discomfort.  Chest discomfort is located left side, sometimes radiating to back.  Been feeling generalized weakness as well.  Went to an urgent care yesterday which showed normal EKG and advised to go to the ED. Has not had a cardiology workup. Denies any fever, chills, nausea, vomiting, changes in urination, or changes in bowel movements. 54-year-old male past medical history of ILD and achalasia coming with complaints of shortness of breath.  Patient reports for the past week he has had shortness of breath and chest discomfort.  Chest discomfort is located left side, sometimes radiating to back.  Been feeling generalized weakness as well.  Went to an urgent care yesterday which showed normal EKG and advised to go to the ED. Has not had a cardiology workup. Denies any fever, chills, nausea, vomiting, changes in urination, or changes in bowel movements.

## 2024-06-03 NOTE — ED PROVIDER NOTE - PATIENT PORTAL LINK FT
You can access the FollowMyHealth Patient Portal offered by [facility] by registering at the following website: [email or web address]. By joining [employer]’s FollowMyHealth portal, you will also be able to view your health information using other applications (apps) compatible with our system.

## 2024-06-03 NOTE — ED ADULT NURSE NOTE - OBJECTIVE STATEMENT
pt presents to ED with c/o chest pain. pt states the chest pain started about 1 week ago and has been gradually getting worse. pt c/o chest pain that starts from the mid radiating to the back. pt c/o SOB, nausea, pain. pt denies fever, headache.

## 2024-06-03 NOTE — ED PROVIDER NOTE - NSFOLLOWUPINSTRUCTIONS_ED_ALL_ED_FT
Please follow-up with PCP in 1 to 3 days. Return precautions explained in full to patient/family.    Our Emergency Department Referral Coordinators will be reaching out to you in the next 24-48 hours from 9:00am to 5:00pm with a follow up appointment. Please expect a phone call from the hospital in that time frame. If you do not receive a call or if you have any questions or concerns, you can reach them at   [phone]).    Chest Pain    Chest pain can be caused by many different conditions which may or may not be dangerous. Causes include heartburn, lung infections, heart attack, blood clot in lungs, skin infections, strain or damage to muscle, cartilage, or bones, etc. In addition to a history and physical examination, an electrocardiogram (ECG) or other lab tests may have been performed to determine the cause of your chest pain. Follow up with your primary care provider or with a cardiologist as instructed.     SEEK IMMEDIATE MEDICAL CARE IF YOU HAVE ANY OF THE FOLLOWING SYMPTOMS: worsening chest pain, coughing up blood, unexplained back/neck/jaw pain, severe abdominal pain, dizziness or lightheadedness, fainting, shortness of breath, sweaty or clammy skin, vomiting, or racing heart beat. These symptoms may represent a serious problem that is an emergency. Do not wait to see if the symptoms will go away. Get medical help right away. Call 911 and do not drive yourself to the hospital.

## 2024-06-03 NOTE — ED ADULT NURSE NOTE - NSFALLUNIVINTERV_ED_ALL_ED
Bed/Stretcher in lowest position, wheels locked, appropriate side rails in place/Call bell, personal items and telephone in reach/Instruct patient to call for assistance before getting out of bed/chair/stretcher/Non-slip footwear applied when patient is off stretcher/[place] to call system/Physically safe environment - no spills, clutter or unnecessary equipment/Purposeful proactive rounding/Room/bathroom lighting operational, light cord in reach

## 2024-06-05 NOTE — CHART NOTE - NSCHARTNOTEFT_GEN_A_CORE
late afternoon preferably after 4pm, any day during the week. 6/4, NS./ scheduled on 7/3/24 @ 4p w/ Dr. Rodrigues @ 501- 6/5-

## 2024-06-27 ENCOUNTER — NON-APPOINTMENT (OUTPATIENT)
Age: 54
End: 2024-06-27

## 2024-07-03 ENCOUNTER — APPOINTMENT (OUTPATIENT)
Dept: CARDIOLOGY | Facility: CLINIC | Age: 54
End: 2024-07-03

## 2024-07-08 ENCOUNTER — RX RENEWAL (OUTPATIENT)
Age: 54
End: 2024-07-08

## 2024-09-11 ENCOUNTER — NON-APPOINTMENT (OUTPATIENT)
Age: 54
End: 2024-09-11

## 2024-10-14 ENCOUNTER — RX RENEWAL (OUTPATIENT)
Age: 54
End: 2024-10-14

## 2024-10-16 DIAGNOSIS — R91.1 SOLITARY PULMONARY NODULE: ICD-10-CM

## 2024-10-29 ENCOUNTER — APPOINTMENT (OUTPATIENT)
Dept: OTOLARYNGOLOGY | Facility: CLINIC | Age: 54
End: 2024-10-29
Payer: COMMERCIAL

## 2024-10-29 ENCOUNTER — NON-APPOINTMENT (OUTPATIENT)
Age: 54
End: 2024-10-29

## 2024-10-29 VITALS
HEART RATE: 85 BPM | BODY MASS INDEX: 25.71 KG/M2 | TEMPERATURE: 98 F | DIASTOLIC BLOOD PRESSURE: 80 MMHG | HEIGHT: 66 IN | WEIGHT: 160 LBS | OXYGEN SATURATION: 98 % | SYSTOLIC BLOOD PRESSURE: 133 MMHG

## 2024-10-29 VITALS
TEMPERATURE: 98 F | HEART RATE: 85 BPM | BODY MASS INDEX: 27.32 KG/M2 | WEIGHT: 170 LBS | OXYGEN SATURATION: 98 % | SYSTOLIC BLOOD PRESSURE: 133 MMHG | HEIGHT: 66 IN | DIASTOLIC BLOOD PRESSURE: 80 MMHG

## 2024-10-29 DIAGNOSIS — H90.3 SENSORINEURAL HEARING LOSS, BILATERAL: ICD-10-CM

## 2024-10-29 DIAGNOSIS — J31.0 CHRONIC RHINITIS: ICD-10-CM

## 2024-10-29 DIAGNOSIS — K21.9 GASTRO-ESOPHAGEAL REFLUX DISEASE W/OUT ESOPHAGITIS: ICD-10-CM

## 2024-10-29 DIAGNOSIS — J38.2 NODULES OF VOCAL CORDS: ICD-10-CM

## 2024-10-29 DIAGNOSIS — H93.13 TINNITUS, BILATERAL: ICD-10-CM

## 2024-10-29 DIAGNOSIS — J34.2 DEVIATED NASAL SEPTUM: ICD-10-CM

## 2024-10-29 PROCEDURE — 99214 OFFICE O/P EST MOD 30 MIN: CPT | Mod: 25

## 2024-10-29 PROCEDURE — 31231 NASAL ENDOSCOPY DX: CPT

## 2024-10-29 RX ORDER — FAMOTIDINE 20 MG/1
20 TABLET, FILM COATED ORAL
Qty: 180 | Refills: 1 | Status: ACTIVE | COMMUNITY
Start: 2024-10-29 | End: 1900-01-01

## 2024-10-29 RX ORDER — IPRATROPIUM BROMIDE 42 UG/1
0.06 SPRAY NASAL
Qty: 3 | Refills: 1 | Status: ACTIVE | COMMUNITY
Start: 2024-10-29 | End: 1900-01-01

## 2024-12-03 ENCOUNTER — APPOINTMENT (OUTPATIENT)
Dept: PULMONOLOGY | Facility: CLINIC | Age: 54
End: 2024-12-03

## 2025-01-02 ENCOUNTER — NON-APPOINTMENT (OUTPATIENT)
Age: 55
End: 2025-01-02

## 2025-01-02 ENCOUNTER — APPOINTMENT (OUTPATIENT)
Dept: PULMONOLOGY | Facility: CLINIC | Age: 55
End: 2025-01-02
Payer: COMMERCIAL

## 2025-01-02 PROCEDURE — 94729 DIFFUSING CAPACITY: CPT

## 2025-01-02 PROCEDURE — 94010 BREATHING CAPACITY TEST: CPT

## 2025-01-02 PROCEDURE — 94727 GAS DIL/WSHOT DETER LNG VOL: CPT

## 2025-01-03 ENCOUNTER — APPOINTMENT (OUTPATIENT)
Dept: PULMONOLOGY | Facility: CLINIC | Age: 55
End: 2025-01-03
Payer: COMMERCIAL

## 2025-01-03 VITALS
WEIGHT: 188 LBS | HEART RATE: 90 BPM | BODY MASS INDEX: 30.34 KG/M2 | DIASTOLIC BLOOD PRESSURE: 70 MMHG | OXYGEN SATURATION: 97 % | SYSTOLIC BLOOD PRESSURE: 122 MMHG | RESPIRATION RATE: 16 BRPM

## 2025-01-03 DIAGNOSIS — R29.818 OTHER SYMPTOMS AND SIGNS INVOLVING THE NERVOUS SYSTEM: ICD-10-CM

## 2025-01-03 DIAGNOSIS — J45.909 UNSPECIFIED ASTHMA, UNCOMPLICATED: ICD-10-CM

## 2025-01-03 DIAGNOSIS — D86.9 SARCOIDOSIS, UNSPECIFIED: ICD-10-CM

## 2025-01-03 PROCEDURE — 99214 OFFICE O/P EST MOD 30 MIN: CPT

## 2025-01-03 PROCEDURE — G2211 COMPLEX E/M VISIT ADD ON: CPT | Mod: NC

## 2025-01-06 ENCOUNTER — EMERGENCY (EMERGENCY)
Facility: HOSPITAL | Age: 55
LOS: 0 days | Discharge: HOME | End: 2025-01-06
Attending: EMERGENCY MEDICINE
Payer: COMMERCIAL

## 2025-01-06 VITALS
TEMPERATURE: 98 F | OXYGEN SATURATION: 100 % | WEIGHT: 158.07 LBS | DIASTOLIC BLOOD PRESSURE: 91 MMHG | RESPIRATION RATE: 18 BRPM | HEART RATE: 84 BPM | SYSTOLIC BLOOD PRESSURE: 135 MMHG

## 2025-01-06 DIAGNOSIS — J84.9 INTERSTITIAL PULMONARY DISEASE, UNSPECIFIED: ICD-10-CM

## 2025-01-06 DIAGNOSIS — R53.83 OTHER FATIGUE: ICD-10-CM

## 2025-01-06 DIAGNOSIS — K22.0 ACHALASIA OF CARDIA: ICD-10-CM

## 2025-01-06 LAB
ACE BLD-CCNC: 48 U/L
ALBUMIN SERPL ELPH-MCNC: 4.7 G/DL
ALBUMIN SERPL ELPH-MCNC: 5.3 G/DL — HIGH (ref 3.5–5.2)
ALP BLD-CCNC: 60 U/L
ALP SERPL-CCNC: 69 U/L — SIGNIFICANT CHANGE UP (ref 30–115)
ALT FLD-CCNC: 24 U/L — SIGNIFICANT CHANGE UP (ref 0–41)
ALT SERPL-CCNC: 22 U/L
ANION GAP SERPL CALC-SCNC: 13 MMOL/L
ANION GAP SERPL CALC-SCNC: 15 MMOL/L — HIGH (ref 7–14)
AST SERPL-CCNC: 20 U/L
AST SERPL-CCNC: 24 U/L — SIGNIFICANT CHANGE UP (ref 0–41)
BASOPHILS # BLD AUTO: 0.04 K/UL — SIGNIFICANT CHANGE UP (ref 0–0.2)
BASOPHILS NFR BLD AUTO: 0.5 % — SIGNIFICANT CHANGE UP (ref 0–1)
BILIRUB SERPL-MCNC: 0.4 MG/DL
BILIRUB SERPL-MCNC: 0.7 MG/DL — SIGNIFICANT CHANGE UP (ref 0.2–1.2)
BUN SERPL-MCNC: 13 MG/DL — SIGNIFICANT CHANGE UP (ref 10–20)
BUN SERPL-MCNC: 16 MG/DL
CALCIUM SERPL-MCNC: 10.1 MG/DL — SIGNIFICANT CHANGE UP (ref 8.4–10.5)
CALCIUM SERPL-MCNC: 9.6 MG/DL
CHLORIDE SERPL-SCNC: 101 MMOL/L — SIGNIFICANT CHANGE UP (ref 98–110)
CHLORIDE SERPL-SCNC: 103 MMOL/L
CO2 SERPL-SCNC: 23 MMOL/L — SIGNIFICANT CHANGE UP (ref 17–32)
CO2 SERPL-SCNC: 24 MMOL/L
CREAT SERPL-MCNC: 1 MG/DL — SIGNIFICANT CHANGE UP (ref 0.7–1.5)
CREAT SERPL-MCNC: 1.1 MG/DL
EGFR: 79 ML/MIN/1.73M2
EGFR: 89 ML/MIN/1.73M2 — SIGNIFICANT CHANGE UP
EOSINOPHIL # BLD AUTO: 0.04 K/UL — SIGNIFICANT CHANGE UP (ref 0–0.7)
EOSINOPHIL NFR BLD AUTO: 0.5 % — SIGNIFICANT CHANGE UP (ref 0–8)
GLUCOSE SERPL-MCNC: 96 MG/DL — SIGNIFICANT CHANGE UP (ref 70–99)
GLUCOSE SERPL-MCNC: 97 MG/DL
HCT VFR BLD CALC: 47.6 % — SIGNIFICANT CHANGE UP (ref 42–52)
HGB BLD-MCNC: 16.7 G/DL — SIGNIFICANT CHANGE UP (ref 14–18)
HIV 1 & 2 AB SERPL IA.RAPID: SIGNIFICANT CHANGE UP
IMM GRANULOCYTES NFR BLD AUTO: 0.4 % — HIGH (ref 0.1–0.3)
LYMPHOCYTES # BLD AUTO: 2.2 K/UL — SIGNIFICANT CHANGE UP (ref 1.2–3.4)
LYMPHOCYTES # BLD AUTO: 27.2 % — SIGNIFICANT CHANGE UP (ref 20.5–51.1)
MAGNESIUM SERPL-MCNC: 2.2 MG/DL — SIGNIFICANT CHANGE UP (ref 1.8–2.4)
MCHC RBC-ENTMCNC: 32.6 PG — HIGH (ref 27–31)
MCHC RBC-ENTMCNC: 35.1 G/DL — SIGNIFICANT CHANGE UP (ref 32–37)
MCV RBC AUTO: 92.8 FL — SIGNIFICANT CHANGE UP (ref 80–94)
MONOCYTES # BLD AUTO: 0.53 K/UL — SIGNIFICANT CHANGE UP (ref 0.1–0.6)
MONOCYTES NFR BLD AUTO: 6.5 % — SIGNIFICANT CHANGE UP (ref 1.7–9.3)
NEUTROPHILS # BLD AUTO: 5.26 K/UL — SIGNIFICANT CHANGE UP (ref 1.4–6.5)
NEUTROPHILS NFR BLD AUTO: 64.9 % — SIGNIFICANT CHANGE UP (ref 42.2–75.2)
NRBC # BLD: 0 /100 WBCS — SIGNIFICANT CHANGE UP (ref 0–0)
PHOSPHATE SERPL-MCNC: 2.9 MG/DL — SIGNIFICANT CHANGE UP (ref 2.1–4.9)
PLATELET # BLD AUTO: 228 K/UL — SIGNIFICANT CHANGE UP (ref 130–400)
PMV BLD: 11.7 FL — HIGH (ref 7.4–10.4)
POTASSIUM SERPL-MCNC: 4.3 MMOL/L — SIGNIFICANT CHANGE UP (ref 3.5–5)
POTASSIUM SERPL-SCNC: 4.3 MMOL/L — SIGNIFICANT CHANGE UP (ref 3.5–5)
POTASSIUM SERPL-SCNC: 4.9 MMOL/L
PROT SERPL-MCNC: 7.4 G/DL
PROT SERPL-MCNC: 8.2 G/DL — HIGH (ref 6–8)
RBC # BLD: 5.13 M/UL — SIGNIFICANT CHANGE UP (ref 4.7–6.1)
RBC # FLD: 12.8 % — SIGNIFICANT CHANGE UP (ref 11.5–14.5)
SODIUM SERPL-SCNC: 139 MMOL/L — SIGNIFICANT CHANGE UP (ref 135–146)
SODIUM SERPL-SCNC: 140 MMOL/L
WBC # BLD: 8.1 K/UL — SIGNIFICANT CHANGE UP (ref 4.8–10.8)
WBC # FLD AUTO: 8.1 K/UL — SIGNIFICANT CHANGE UP (ref 4.8–10.8)

## 2025-01-06 PROCEDURE — 93005 ELECTROCARDIOGRAM TRACING: CPT

## 2025-01-06 PROCEDURE — 71045 X-RAY EXAM CHEST 1 VIEW: CPT | Mod: 26

## 2025-01-06 PROCEDURE — 84100 ASSAY OF PHOSPHORUS: CPT

## 2025-01-06 PROCEDURE — 86703 HIV-1/HIV-2 1 RESULT ANTBDY: CPT

## 2025-01-06 PROCEDURE — 99285 EMERGENCY DEPT VISIT HI MDM: CPT

## 2025-01-06 PROCEDURE — 99285 EMERGENCY DEPT VISIT HI MDM: CPT | Mod: 25

## 2025-01-06 PROCEDURE — 80053 COMPREHEN METABOLIC PANEL: CPT

## 2025-01-06 PROCEDURE — 93308 TTE F-UP OR LMTD: CPT

## 2025-01-06 PROCEDURE — 83735 ASSAY OF MAGNESIUM: CPT

## 2025-01-06 PROCEDURE — 93010 ELECTROCARDIOGRAM REPORT: CPT

## 2025-01-06 PROCEDURE — 93308 TTE F-UP OR LMTD: CPT | Mod: 26

## 2025-01-06 PROCEDURE — 85025 COMPLETE CBC W/AUTO DIFF WBC: CPT

## 2025-01-06 PROCEDURE — 36415 COLL VENOUS BLD VENIPUNCTURE: CPT

## 2025-01-06 PROCEDURE — 71045 X-RAY EXAM CHEST 1 VIEW: CPT

## 2025-01-06 NOTE — ED PROVIDER NOTE - OBJECTIVE STATEMENT
Patient is a 54-year-old male, past medical history of interstitial lung disease, achalasia, comes in for generalized fatigue.  Patient reports feeling weak for several weeks.  Has been evaluated by PMD, blood work has been done but not fully resulted.  Also has been seen by his pulmonologist, had a CT scan of his chest 2 months ago, findings corresponds with his interstitial lung disease and suspected sarcoidosis.  Patient denies fevers, chills, chest pain, abdominal pain, dysuria, hematuria, diarrhea.

## 2025-01-06 NOTE — ED PROVIDER NOTE - PATIENT PORTAL LINK FT
You can access the FollowMyHealth Patient Portal offered by [facility] by registering at the following website: [email or web address]. By joining [employer]’s FollowMyHealth portal, you will also be able to view your health information using other applications (apps) compatible with our system.

## 2025-01-06 NOTE — ED PROVIDER NOTE - CLINICAL SUMMARY MEDICAL DECISION MAKING FREE TEXT BOX
Patient presents with generalized fatigue. Labs and imaging done. No acute findings. Bedside echo normal. All results discussed. Patient has follow-up with his primary care physician. Return precautions discussed.

## 2025-01-06 NOTE — ED PROVIDER NOTE - ATTENDING CONTRIBUTION TO CARE
54-year-old male past med history of achalasia, interstitial lung disease, sarcoidosis presents with generalized fatigue. Patient reports that since October he has been feeling off. Unable to characterize it. States that he is a fatigue, generalized weakness. Denies any weight change. Also reports some shortness of breath has been chronic. Says pulmonologist for it and had negative imaging. No new symptoms. No urinary symptoms. No abdominal pain. No chest pain or pressure. No URI symptoms or recent illness. Patient had a blood work done as an outpatient recently and is still getting results from. Was concerned that he still feeling this way so came in for evaluation.    CONSTITUTIONAL: Well-developed; well-nourished; in no acute distress.   SKIN: warm, dry  HEAD: Normocephalic; atraumatic.  EYES: PERRL, EOMI, no conjunctival erythema  ENT: No nasal discharge; airway clear.  NECK: Supple; non tender.  CARD: S1, S2 normal;  Regular rate and rhythm.   RESP: No wheezes, rales or rhonchi.  ABD: soft non tender, non distended, no rebound or guarding  EXT: Normal ROM.  5/5 strength in all 4 extremities   LYMPH: No acute cervical adenopathy.  NEURO: Alert, oriented, grossly unremarkable. neurovascularly intact  PSYCH: Cooperative, appropriate.

## 2025-01-06 NOTE — ED PROVIDER NOTE - PHYSICAL EXAMINATION
Progress Note - Initial Session  Disclaimer: This note consists of symbols derived from keyboarding, dictation and/or voice recognition software. As a result, there may be errors in the script that have gone undetected. Please consider this when interpreting information found in this chart.    Client Name:  Lay Mccain Date: 11/1/2019         Service Type: Individual  Video Visit: No     Session Start Time: 1:00 PM  session End Time: 1:45 PM     Session Length: 45    Session #: 1    Attendees: Client attended alone     DATA:  Diagnostic Assessment in progress.  Unable to complete documentation at the conclusion of the first session due to needing more information.  Client's presenting problem include marital stress due to 's difficulty managing his anger better and has has been a repeating pattern throughout the 32 years of the marriage, however it is beginning to affect their 15-year-old daughter.  Client does not feel endangered in her home environment but acknowledges that his anger is affecting her emotionally.    Interactive Complexity: No  Crisis: No    Intervention:  CBT: 1.  Ensure safety, #2 identify sources of support    ASSESSMENT:  Mental Status Assessment:  Appearance:   Appropriate   Eye Contact:   Good   Psychomotor Behavior: Normal   Attitude:   Cooperative   Orientation:   All  Speech   Rate / Production: Normal    Volume:  Normal   Mood:    Normal  Affect:    Appropriate   Thought Content:  Clear   Thought Form:  Coherent  Logical   Insight:    Good       Safety Issues and Plan for Safety and Risk Management:  Client reports the following current fears or concerns for personal safety: As noted above  has anger issues though has never become physically abusive.  Client denies current or recent suicidal ideation or behaviors.  Client denies current or recent homicidal ideation or behaviors.  Client denies current or recent self injurious behavior or ideation.  Client  GENERAL: Well-developed; well-nourished; in no acute distress. AO  SKIN: warm, well perfused  HEAD: Normocephalic; atraumatic.  EYES: no conjunctival erythema, ocular motions intact and appropriate  ENT: airway clear.  CARD: Regular rate and rhythm.   RESP: LCTAB; No wheezes or crackles  ABD: soft, nontender, and nondistended  Upper EXT: Normal ROM. Rad pulses 2+ symm, sensation intact and symm  Lower EXT: moving b/l LEs, sensation intact and symm denies other safety concerns.  Recommended that patient call 911 or go to the local ED should there be a change in any of these risk factors.  Client reports there are firearms in the house. The firearms are secured in a locked space.      Diagnostic Criteria:  A. The development of emotional or behavioral symptoms in response to an identifiable stressor(s) occurring within 3 months of the onset of the stressor(s)  B. These symptoms or behaviors are clinically significant, as evidenced by one or both of the following:       - Marked distress that is out of proportion to the severity/intensity of the stressor (with consideration for external context & culture)       - Significant impairment in social, occupational, or other important areas of functioning  C. The stress-related disturbance does not meet criteria for another disorder & is not not an exacerbation of another mental disorder  D. The symptoms do not represent normal bereavement  E. Once the stressor or its consequences have terminated, the symptoms do not persist for more than an additional 6 months       * Adjustment Disorder with Mixed Anxiety and Depressed Mood: The predominant manifestation is a combination of depression and anxiety      DSM5 Diagnoses: (Sustained by DSM5 Criteria Listed Above)  Diagnoses: Adjustment Disorders  309.28 (F43.23) With mixed anxiety and depressed mood  Psychosocial & Contextual Factors: Rule out PTSD.  Rule out depression.  WHODAS 2.0 (12 item)            This questionnaire asks about difficulties due to health conditions. Health conditions  include  disease or illnesses, other health problems that may be short or long lasting,  injuries, mental health or emotional problems, and problems with alcohol or drugs.                     Think back over the past 30 days and answer these questions, thinking about how much  difficulty you had doing the following activities. For each question, please [place] only  one response.    S1  Standing for long periods such as 30 minutes? None =         1   S2 Taking care of household responsibilities? None =         1   S3 Learning a new task, for example, learning how to get to a new place? None =         1   S4 How much of a problem do you have joining community activities (for example, festivals, [employer] or other activities) in the same way as anyone else can? None =         1   S5 How much have you been emotionally affected by your health problems? None =         1     In the past 30 days, how much difficulty did you have in:   S6 Concentrating on doing something for ten minutes? None =         1   S7 Walking a long distance such as a kilometer (or equivalent)? None =         1   S8 Washing your whole body? None =         1   S9 Getting dressed? None =         1   S10 Dealing with people you do not know? None =         1   S11 Maintaining a friendship? None =         1   S12 Your day to day work? None =         1     H1 Overall, in the past 30 days, how many days were these difficulties present? Record number of days 0   H2 In the past 30 days, for how many days were you totally unable to carry out your usual activities or work because of any health condition? Record number of days  0   H3 In the past 30 days, not counting the days that you were totally unable, for how many days did you cut back or reduce your usual activities or work because of any health condition? Record number of days 0       Collateral Reports Completed:  Not Applicable      PLAN: (Homework, other):  Client stated that she may follow up for ongoing services with [facility].  Client would like to do a joint session with her .  We will plan on this after several individual sessions.      Lizandro Talbot

## 2025-01-06 NOTE — ED PROVIDER NOTE - NSFOLLOWUPINSTRUCTIONS_ED_ALL_ED_FT
Please follow up with your primary care physician and any medical specialist(s) if they were recommended. If you've been prescribed medications, please take your medications as prescribed. Return to the emergency department if your symptoms do not resolve and/or worsen.  ------------------------------------------------------------------------------------------------------------------------  Fatigue    WHAT YOU NEED TO KNOW:    What is fatigue? Fatigue is mental and physical exhaustion that does not get better with rest. Fatigue may make daily activities difficult or cause extreme sleepiness. It is normal to feel tired sometimes, but long-term fatigue may be a sign of serious illness.    What increases my risk for fatigue?    Health conditions such as anemia, thyroid problems, diabetes, heart disease, or cancer    Chemotherapy or radiation therapy    An infection or a sleep disorder    Depression, anxiety, or stress    Medicines such as beta-blockers, antihistamines, or antidepressants    Lack of proper nutrition  How is the cause of fatigue diagnosed? Your healthcare provider will ask about your sleep habits, appetite, activities, stress level, and exercise. He or she will ask about your medical history and what medicines you take. You may also need any of the following:    Blood tests are used to check for infection, diabetes, and other illnesses. These tests also show how well your thyroid, liver, kidneys, and other organs are working.    Urine tests will show if you are pregnant, or have other health problems.    Sleep studies may be used to find out if your fatigue is caused by a sleep disorder.  How is fatigue treated? Your symptoms may get better without treatment. You will receive treatment for any health conditions that may be causing your fatigue. The following can help you manage fatigue:    Keep a fatigue diary. Include anything that makes you feel more tired or less tired. Bring the diary with you to follow-up visits with your provider.    Exercise as directed. Exercise can help you feel more alert. Exercise can also help you manage stress or relieve depression. Try to get at least 30 minutes of exercise most days of the week.   FAMILY WALKING FOR EXERCISE      Keep a regular sleep schedule. Go to bed and wake up at the same times every day. Limit naps to 1 hour each day. A nap can improve fatigue, but a long nap may make it harder to go to sleep at night.    Plan and limit your activities. Limit the number of activities such as shopping and cleaning you do each day. If possible, try to spread out your trips throughout the week. Plan ahead so you are not rushing to get something done. Only do activities that you have the energy to complete. Take breaks between activities. Ask for help if you need it. Another person may be able to drive you or help with daily activities.    Eat a variety of healthy foods. Healthy foods include fruits, vegetables, whole-grain breads, low-fat dairy products, beans, lean meats, and fish. Good nutrition can help manage fatigue.  Healthy Foods      Limit caffeine and alcohol. These can make it difficult to fall or stay asleep. Women should limit alcohol to 1 drink a day. Men should limit alcohol to 2 drinks a day. A drink of alcohol is 12 ounces of beer, 5 ounces of wine, or 1½ ounces of liquor. Ask our healthcare provider how much caffeine is safe for you.    Do not smoke. Nicotine and other chemicals in cigarettes and cigars can cause lung damage and increase fatigue. Ask your healthcare provider for information if you currently smoke and need help to quit. E-cigarettes or smokeless tobacco still contain nicotine. Talk to your healthcare provider before you use these products.  When should I seek immediate care?    You have chest pain.    You have difficulty breathing.  When should I contact my healthcare provider?    You have a cough that gets worse, or does not go away.    You see blood in your urine or bowel movement.    You have numbness or tingling around your mouth or in an arm or leg.    You faint, feel dizzy, or have vision changes.    You have swelling in your lymph nodes.    You are a woman and have vaginal bleeding that is not normal for you, or is not expected.    You lose weight without trying, or you have trouble eating.    You feel weak or have muscle pain.    You have pain or swelling in your joints.    You have questions or concerns about your condition or care.  CARE AGREEMENT:    You have the right to help plan your care. Learn about your health condition and how it may be treated. Discuss treatment options with your healthcare providers to decide what care you want to receive. You always have the right to refuse treatment.

## 2025-02-18 ENCOUNTER — NON-APPOINTMENT (OUTPATIENT)
Age: 55
End: 2025-02-18

## 2025-02-27 ENCOUNTER — EMERGENCY (EMERGENCY)
Facility: HOSPITAL | Age: 55
LOS: 0 days | Discharge: HOME | End: 2025-02-27
Attending: EMERGENCY MEDICINE
Payer: COMMERCIAL

## 2025-02-27 ENCOUNTER — NON-APPOINTMENT (OUTPATIENT)
Age: 55
End: 2025-02-27

## 2025-02-27 VITALS
SYSTOLIC BLOOD PRESSURE: 127 MMHG | HEART RATE: 87 BPM | RESPIRATION RATE: 16 BRPM | DIASTOLIC BLOOD PRESSURE: 90 MMHG | TEMPERATURE: 98 F | WEIGHT: 147.93 LBS | OXYGEN SATURATION: 100 %

## 2025-02-27 VITALS — RESPIRATION RATE: 16 BRPM | OXYGEN SATURATION: 97 %

## 2025-02-27 LAB
ALBUMIN SERPL ELPH-MCNC: 4.5 G/DL — SIGNIFICANT CHANGE UP (ref 3.5–5.2)
ALP SERPL-CCNC: 60 U/L — SIGNIFICANT CHANGE UP (ref 30–115)
ALT FLD-CCNC: 31 U/L — SIGNIFICANT CHANGE UP (ref 0–41)
ANION GAP SERPL CALC-SCNC: 11 MMOL/L — SIGNIFICANT CHANGE UP (ref 7–14)
AST SERPL-CCNC: 23 U/L — SIGNIFICANT CHANGE UP (ref 0–41)
BASE EXCESS BLDV CALC-SCNC: 1.2 MMOL/L — SIGNIFICANT CHANGE UP (ref -2–3)
BASOPHILS # BLD AUTO: 0.04 K/UL — SIGNIFICANT CHANGE UP (ref 0–0.2)
BASOPHILS NFR BLD AUTO: 0.6 % — SIGNIFICANT CHANGE UP (ref 0–1)
BILIRUB SERPL-MCNC: 0.6 MG/DL — SIGNIFICANT CHANGE UP (ref 0.2–1.2)
BUN SERPL-MCNC: 13 MG/DL — SIGNIFICANT CHANGE UP (ref 10–20)
CA-I SERPL-SCNC: 1.28 MMOL/L — SIGNIFICANT CHANGE UP (ref 1.15–1.33)
CALCIUM SERPL-MCNC: 9.5 MG/DL — SIGNIFICANT CHANGE UP (ref 8.4–10.5)
CHLORIDE SERPL-SCNC: 106 MMOL/L — SIGNIFICANT CHANGE UP (ref 98–110)
CO2 SERPL-SCNC: 25 MMOL/L — SIGNIFICANT CHANGE UP (ref 17–32)
CREAT SERPL-MCNC: 0.9 MG/DL — SIGNIFICANT CHANGE UP (ref 0.7–1.5)
EGFR: 101 ML/MIN/1.73M2 — SIGNIFICANT CHANGE UP
EOSINOPHIL # BLD AUTO: 0.06 K/UL — SIGNIFICANT CHANGE UP (ref 0–0.7)
EOSINOPHIL NFR BLD AUTO: 0.9 % — SIGNIFICANT CHANGE UP (ref 0–8)
GAS PNL BLDV: 137 MMOL/L — SIGNIFICANT CHANGE UP (ref 136–145)
GAS PNL BLDV: SIGNIFICANT CHANGE UP
GAS PNL BLDV: SIGNIFICANT CHANGE UP
GLUCOSE SERPL-MCNC: 95 MG/DL — SIGNIFICANT CHANGE UP (ref 70–99)
HCO3 BLDV-SCNC: 27 MMOL/L — SIGNIFICANT CHANGE UP (ref 22–29)
HCT VFR BLD CALC: 44.4 % — SIGNIFICANT CHANGE UP (ref 42–52)
HCT VFR BLDA CALC: 47 % — SIGNIFICANT CHANGE UP (ref 39–51)
HGB BLD CALC-MCNC: 15.5 G/DL — SIGNIFICANT CHANGE UP (ref 12.6–17.4)
HGB BLD-MCNC: 15.3 G/DL — SIGNIFICANT CHANGE UP (ref 14–18)
IMM GRANULOCYTES NFR BLD AUTO: 0.4 % — HIGH (ref 0.1–0.3)
LACTATE BLDV-MCNC: 1.3 MMOL/L — SIGNIFICANT CHANGE UP (ref 0.5–2)
LYMPHOCYTES # BLD AUTO: 2.05 K/UL — SIGNIFICANT CHANGE UP (ref 1.2–3.4)
LYMPHOCYTES # BLD AUTO: 30.5 % — SIGNIFICANT CHANGE UP (ref 20.5–51.1)
MCHC RBC-ENTMCNC: 32.8 PG — HIGH (ref 27–31)
MCHC RBC-ENTMCNC: 34.5 G/DL — SIGNIFICANT CHANGE UP (ref 32–37)
MCV RBC AUTO: 95.3 FL — HIGH (ref 80–94)
MONOCYTES # BLD AUTO: 0.6 K/UL — SIGNIFICANT CHANGE UP (ref 0.1–0.6)
MONOCYTES NFR BLD AUTO: 8.9 % — SIGNIFICANT CHANGE UP (ref 1.7–9.3)
NEUTROPHILS # BLD AUTO: 3.94 K/UL — SIGNIFICANT CHANGE UP (ref 1.4–6.5)
NEUTROPHILS NFR BLD AUTO: 58.7 % — SIGNIFICANT CHANGE UP (ref 42.2–75.2)
NRBC BLD AUTO-RTO: 0 /100 WBCS — SIGNIFICANT CHANGE UP (ref 0–0)
PCO2 BLDV: 47 MMHG — SIGNIFICANT CHANGE UP (ref 42–55)
PH BLDV: 7.37 — SIGNIFICANT CHANGE UP (ref 7.32–7.43)
PLATELET # BLD AUTO: 325 K/UL — SIGNIFICANT CHANGE UP (ref 130–400)
PMV BLD: 11.5 FL — HIGH (ref 7.4–10.4)
PO2 BLDV: 19 MMHG — LOW (ref 25–45)
POTASSIUM BLDV-SCNC: 4.1 MMOL/L — SIGNIFICANT CHANGE UP (ref 3.5–5.1)
POTASSIUM SERPL-MCNC: 4.6 MMOL/L — SIGNIFICANT CHANGE UP (ref 3.5–5)
POTASSIUM SERPL-SCNC: 4.6 MMOL/L — SIGNIFICANT CHANGE UP (ref 3.5–5)
PROT SERPL-MCNC: 7.2 G/DL — SIGNIFICANT CHANGE UP (ref 6–8)
RBC # BLD: 4.66 M/UL — LOW (ref 4.7–6.1)
RBC # FLD: 13.3 % — SIGNIFICANT CHANGE UP (ref 11.5–14.5)
SAO2 % BLDV: 21.6 % — LOW (ref 67–88)
SODIUM SERPL-SCNC: 142 MMOL/L — SIGNIFICANT CHANGE UP (ref 135–146)
WBC # BLD: 6.72 K/UL — SIGNIFICANT CHANGE UP (ref 4.8–10.8)
WBC # FLD AUTO: 6.72 K/UL — SIGNIFICANT CHANGE UP (ref 4.8–10.8)

## 2025-02-27 PROCEDURE — 71250 CT THORAX DX C-: CPT | Mod: MC

## 2025-02-27 PROCEDURE — 84132 ASSAY OF SERUM POTASSIUM: CPT

## 2025-02-27 PROCEDURE — 85025 COMPLETE CBC W/AUTO DIFF WBC: CPT

## 2025-02-27 PROCEDURE — 85018 HEMOGLOBIN: CPT

## 2025-02-27 PROCEDURE — 99284 EMERGENCY DEPT VISIT MOD MDM: CPT | Mod: 25

## 2025-02-27 PROCEDURE — 99285 EMERGENCY DEPT VISIT HI MDM: CPT

## 2025-02-27 PROCEDURE — 36415 COLL VENOUS BLD VENIPUNCTURE: CPT

## 2025-02-27 PROCEDURE — 71045 X-RAY EXAM CHEST 1 VIEW: CPT | Mod: 26

## 2025-02-27 PROCEDURE — 80053 COMPREHEN METABOLIC PANEL: CPT

## 2025-02-27 PROCEDURE — 83605 ASSAY OF LACTIC ACID: CPT

## 2025-02-27 PROCEDURE — 71045 X-RAY EXAM CHEST 1 VIEW: CPT

## 2025-02-27 PROCEDURE — 0241U: CPT

## 2025-02-27 PROCEDURE — 82330 ASSAY OF CALCIUM: CPT

## 2025-02-27 PROCEDURE — 82803 BLOOD GASES ANY COMBINATION: CPT

## 2025-02-27 PROCEDURE — 36000 PLACE NEEDLE IN VEIN: CPT

## 2025-02-27 PROCEDURE — 84295 ASSAY OF SERUM SODIUM: CPT

## 2025-02-27 PROCEDURE — 71250 CT THORAX DX C-: CPT | Mod: 26

## 2025-02-27 PROCEDURE — 94640 AIRWAY INHALATION TREATMENT: CPT

## 2025-02-27 PROCEDURE — 85014 HEMATOCRIT: CPT

## 2025-02-27 RX ORDER — IPRATROPIUM BROMIDE AND ALBUTEROL SULFATE .5; 2.5 MG/3ML; MG/3ML
3 SOLUTION RESPIRATORY (INHALATION) ONCE
Refills: 0 | Status: COMPLETED | OUTPATIENT
Start: 2025-02-27 | End: 2025-02-27

## 2025-02-27 RX ORDER — SODIUM CHLORIDE 9 G/ML
1000 INJECTION, SOLUTION INTRAVENOUS ONCE
Refills: 0 | Status: COMPLETED | OUTPATIENT
Start: 2025-02-27 | End: 2025-02-27

## 2025-02-27 RX ADMIN — SODIUM CHLORIDE 1000 MILLILITER(S): 9 INJECTION, SOLUTION INTRAVENOUS at 21:04

## 2025-02-27 RX ADMIN — IPRATROPIUM BROMIDE AND ALBUTEROL SULFATE 3 MILLILITER(S): .5; 2.5 SOLUTION RESPIRATORY (INHALATION) at 17:52

## 2025-02-27 NOTE — ED PROVIDER NOTE - CARE PROVIDER_API CALL
Mikey Daigle  Critical Care Medicine  [address] [place] [address]  Phone: [phone]  Fax: [phone]  Follow Up Time: 1-3 Days

## 2025-02-27 NOTE — ED PROVIDER NOTE - PHYSICAL EXAMINATION
CONSTITUTIONAL: non-toxic appearing male, nad  SKIN: skin exam is warm and dry  HEAD: Normocephalic; atraumatic  EYES: PERRL, conjunctiva and sclera clear.  ENT: MMM   CARD: S1, S2 normal, no murmur  RESP: +scant wheeze, no increased wob, speaking full sentences, no accessory muscle use   ABD: soft; non-distended; non-tender  EXT: Normal ROM.    NEURO: awake, alert, following commands, oriented, grossly unremarkable. No Focal deficits. GCS 15.   PSYCH: Cooperative

## 2025-02-27 NOTE — ED ADULT NURSE NOTE - OBJECTIVE STATEMENT
pt presents to ED with c/o weakness. pt states he went to urgent care earlier but was sent to ED instead. pt states he had flu about two weeks ago but states he still feels weak.

## 2025-02-27 NOTE — ED PROVIDER NOTE - OBJECTIVE STATEMENT
54 year old male, past medical history interstitial lung disease, achalasia, who presents with cough. patient with generalized body aches, sore throat and cough diagnosed with influenza x2 weeks ago. patient presented to urgent care 2/12 for sx, started and completed doxycycline. patient presents to ed with persistent cough and weakness. non-smoker. denies f/c, chest pain, hemoptysis, vomiting/diarrhea.

## 2025-02-27 NOTE — ED PROVIDER NOTE - PATIENT PORTAL LINK FT
You can access the FollowMyHealth Patient Portal offered by [facility] by registering at the following website: [email or web address]. By joining [employer]’s FollowMyHealth portal, you will also be able to view your health information using other applications (apps) compatible with our system.

## 2025-02-27 NOTE — ED PROVIDER NOTE - NSFOLLOWUPINSTRUCTIONS_ED_ALL_ED_FT
Please follow up with your primary care doctor and pulmonologist in 1-3 days  Please be aware of any new or worsening signs or symptoms that should prompt your return to the ER.    Weakness    Weakness is a lack of strength. It may be felt all over the body (generalized) or in one specific part of the body (focal). Some causes of weakness can be serious. You may need further medical evaluation, especially if you are elderly or you have a history of immunosuppression (such as chemotherapy or HIV), kidney disease, heart disease, or diabetes.     CAUSES  Weakness can be caused by many different things, including:    Infection.  Physical exhaustion.  Internal bleeding or other blood loss that results in a lack of red blood cells (anemia).  Dehydration. This cause is more common in elderly people.  Side effects or electrolyte abnormalities from medicines, such as pain medicines or sedatives.  Emotional distress, anxiety, or depression.  Circulation problems, especially severe peripheral arterial disease.  Heart disease, such as rapid atrial fibrillation, bradycardia, or heart failure.  Nervous system disorders, such as Guillain-Barré syndrome, multiple sclerosis, or stroke.    DIAGNOSIS  To find the cause of your weakness, your caregiver will take your history and perform a physical exam. Lab tests or X-rays may also be ordered, if needed.    TREATMENT  Treatment of weakness depends on the cause of your symptoms and can vary greatly.    HOME CARE INSTRUCTIONS  Rest as needed.  Eat a well-balanced diet.  Try to get some exercise every day.  Only take over-the-counter or prescription medicines as directed by your caregiver.    SEEK MEDICAL CARE IF:  Your weakness seems to be getting worse or spreads to other parts of your body.  You develop new aches or pains.    SEEK IMMEDIATE MEDICAL CARE IF:  You cannot perform your normal daily activities, such as getting dressed and feeding yourself.  You cannot walk up and down stairs, or you feel exhausted when you do so.  You have shortness of breath or chest pain.  You have difficulty moving parts of your body.   You have weakness in only one area of the body or on only one side of the body.  You have a fever.  You have trouble speaking or swallowing.  You cannot control your bladder or bowel movements.  You have black or bloody vomit or stools.    MAKE SURE YOU:  Understand these instructions.  Will watch your condition.  Will get help right away if you are not doing well or get worse.    ADDITIONAL NOTES AND INSTRUCTIONS    Please follow up with your Primary MD in 24-48 hr.  Seek immediate medical care for any new/worsening signs or symptoms.     Acute Cough    AMBULATORY CARE:    An acute cough can last up to 3 weeks. Common causes of an acute cough include a cold, allergies, or a lung infection.     Seek care immediately if:   -You have trouble breathing or feel short of breath.  -You cough up blood, or you see blood in your mucus.   -You faint or feel weak or dizzy.   -You have chest pain when you cough or take a deep breath.   -You have new wheezing.     Contact your healthcare provider if:   -You have a fever.   -Your cough lasts longer than 4 weeks.   -Your symptoms do not improve with treatment.   -You have questions or concerns about your condition or care.     Treatment: An acute cough usually goes away on its own. Ask your healthcare provider about medicines you can take to decrease your cough. You may need medicine to stop the cough, decrease swelling in your airways, or help open your airways. Medicine may also be given to help you cough up mucus. If you have an infection caused by bacteria, you may need antibiotics.     Manage your symptoms:     Do not smoke and stay away from others who smoke. Nicotine and other chemicals in cigarettes and cigars can cause lung damage and make your cough worse. Ask your healthcare provider for information if you currently smoke and need help to quit. E-cigarettes or smokeless tobacco still contain nicotine. Talk to your healthcare provider before you use these products.       Drink extra liquids as directed. Liquids will help thin and loosen mucus so you can cough it up. Liquids will also help prevent dehydration. Examples of good liquids to drink include water, fruit juice, and broth. Do not drink liquids that contain caffeine. Caffeine can increase your risk for dehydration. Ask your healthcare provider how much liquid to drink each day.       Rest as directed. Do not do activities that make your cough worse, such as exercise.       Use a humidifier or vaporizer. Use a cool mist humidifier or a vaporizer to increase air moisture in your home. This may make it easier for you to breathe and help decrease your cough.       Eat 2 to 5 mL of honey 2 times each day. Honey can help thin mucus and decrease your cough.       Use cough drops or lozenges. These can help decrease throat irritation and your cough.     Follow up with your healthcare provider as directed: Write down your questions so you remember to ask them during your visits.

## 2025-02-27 NOTE — ED PROVIDER NOTE - CLINICAL SUMMARY MEDICAL DECISION MAKING FREE TEXT BOX
Pt and son given discharge instructions at the bedside. Pt IV access removed; bleeding controlled and Catheter intact. Pt given a copy at the bedside with insion care and follow up appointment.    patient remained stable in ED, improved well, results of the diagnostic studies reviewed and discussed with patient, Patient remained awake, alert, ambulatory and comfortable, tolerated PO. Appropriate medications given and effects reassessed.  Discussed with patient in detail about the need for close outpatient follow up and the need to return to ED for any persistent, or worsening symptoms, for any new symptoms/concerns. patient verbalized understanding and agreed. patient is given detail aftercare instructions and is instructed well to f/u as outpatient for further care.

## 2025-02-27 NOTE — ED PROVIDER NOTE - PROGRESS NOTE DETAILS
rpt pulse ox 97% ora. clippings 2/2 procedure associated with achalasia. results thoroughly discussed with patient and family at bedside, patient to fu with pmd tomorrow and pulmonologist, dr wilkerson, in 1-3 days   patient educated on signs and symptoms to be aware of that should prompt urgent return to the er. patient verbalizes understanding and is agreeable with plan

## 2025-02-28 LAB
FLUAV AG NPH QL: SIGNIFICANT CHANGE UP
FLUBV AG NPH QL: SIGNIFICANT CHANGE UP
RSV RNA NPH QL NAA+NON-PROBE: SIGNIFICANT CHANGE UP
SARS-COV-2 RNA SPEC QL NAA+PROBE: SIGNIFICANT CHANGE UP

## 2025-04-14 ENCOUNTER — RX RENEWAL (OUTPATIENT)
Age: 55
End: 2025-04-14

## 2025-04-29 ENCOUNTER — APPOINTMENT (OUTPATIENT)
Dept: PULMONOLOGY | Facility: CLINIC | Age: 55
End: 2025-04-29

## 2025-05-12 ENCOUNTER — RX RENEWAL (OUTPATIENT)
Age: 55
End: 2025-05-12

## 2025-05-30 ENCOUNTER — OUTPATIENT (OUTPATIENT)
Dept: OUTPATIENT SERVICES | Facility: HOSPITAL | Age: 55
LOS: 1 days | End: 2025-05-30
Payer: COMMERCIAL

## 2025-05-30 DIAGNOSIS — R07.9 CHEST PAIN, UNSPECIFIED: ICD-10-CM

## 2025-05-30 PROCEDURE — 71046 X-RAY EXAM CHEST 2 VIEWS: CPT | Mod: 26

## 2025-05-30 PROCEDURE — 71046 X-RAY EXAM CHEST 2 VIEWS: CPT

## 2025-05-31 DIAGNOSIS — R07.9 CHEST PAIN, UNSPECIFIED: ICD-10-CM

## 2025-06-05 ENCOUNTER — APPOINTMENT (OUTPATIENT)
Dept: ORTHOPEDIC SURGERY | Facility: CLINIC | Age: 55
End: 2025-06-05
Payer: COMMERCIAL

## 2025-06-05 DIAGNOSIS — S33.5XXA SPRAIN OF LIGAMENTS OF LUMBAR SPINE, INITIAL ENCOUNTER: ICD-10-CM

## 2025-06-05 DIAGNOSIS — S13.9XXA SPRAIN OF JOINTS AND LIGAMENTS OF UNSPECIFIED PARTS OF NECK, INITIAL ENCOUNTER: ICD-10-CM

## 2025-06-05 PROCEDURE — 99214 OFFICE O/P EST MOD 30 MIN: CPT

## 2025-07-01 ENCOUNTER — APPOINTMENT (OUTPATIENT)
Dept: PULMONOLOGY | Facility: CLINIC | Age: 55
End: 2025-07-01

## 2025-07-03 ENCOUNTER — APPOINTMENT (OUTPATIENT)
Dept: PULMONOLOGY | Facility: CLINIC | Age: 55
End: 2025-07-03

## 2025-08-25 ENCOUNTER — APPOINTMENT (OUTPATIENT)
Dept: PULMONOLOGY | Facility: CLINIC | Age: 55
End: 2025-08-25
Payer: COMMERCIAL

## 2025-08-25 VITALS
HEART RATE: 76 BPM | RESPIRATION RATE: 15 BRPM | DIASTOLIC BLOOD PRESSURE: 70 MMHG | OXYGEN SATURATION: 97 % | BODY MASS INDEX: 24.27 KG/M2 | WEIGHT: 151 LBS | SYSTOLIC BLOOD PRESSURE: 110 MMHG | HEIGHT: 66 IN

## 2025-08-25 DIAGNOSIS — R06.02 SHORTNESS OF BREATH: ICD-10-CM

## 2025-08-25 DIAGNOSIS — D86.9 SARCOIDOSIS, UNSPECIFIED: ICD-10-CM

## 2025-08-25 DIAGNOSIS — R09.82 POSTNASAL DRIP: ICD-10-CM

## 2025-08-25 DIAGNOSIS — J45.909 UNSPECIFIED ASTHMA, UNCOMPLICATED: ICD-10-CM

## 2025-08-25 PROCEDURE — G2211 COMPLEX E/M VISIT ADD ON: CPT | Mod: NC

## 2025-08-25 PROCEDURE — 99214 OFFICE O/P EST MOD 30 MIN: CPT

## 2025-08-25 RX ORDER — BUDESONIDE AND FORMOTEROL FUMARATE DIHYDRATE 160; 4.5 UG/1; UG/1
160-4.5 AEROSOL RESPIRATORY (INHALATION) TWICE DAILY
Qty: 1 | Refills: 3 | Status: ACTIVE | COMMUNITY
Start: 2025-08-25 | End: 1900-01-01

## 2025-08-25 RX ORDER — AZELASTINE HYDROCHLORIDE 137 UG/1
0.1 SPRAY, METERED NASAL
Qty: 1 | Refills: 3 | Status: ACTIVE | COMMUNITY
Start: 2025-08-25 | End: 1900-01-01